# Patient Record
Sex: FEMALE | Race: WHITE | Employment: UNEMPLOYED | URBAN - METROPOLITAN AREA
[De-identification: names, ages, dates, MRNs, and addresses within clinical notes are randomized per-mention and may not be internally consistent; named-entity substitution may affect disease eponyms.]

---

## 2017-05-19 ENCOUNTER — TELEPHONE (OUTPATIENT)
Dept: VASCULAR SURGERY | Age: 82
End: 2017-05-19

## 2017-06-08 ENCOUNTER — HOSPITAL ENCOUNTER (OUTPATIENT)
Dept: VASCULAR LAB | Age: 82
Discharge: HOME | End: 2017-06-08
Attending: FAMILY MEDICINE | Admitting: FAMILY MEDICINE

## 2017-06-08 DIAGNOSIS — L03.116 CELLULITIS OF LEFT LOWER EXTREMITY: ICD-10-CM

## 2017-06-22 ENCOUNTER — OFFICE VISIT (OUTPATIENT)
Dept: VASCULAR SURGERY | Age: 82
End: 2017-06-22

## 2017-06-22 VITALS
SYSTOLIC BLOOD PRESSURE: 110 MMHG | HEIGHT: 65 IN | WEIGHT: 142 LBS | BODY MASS INDEX: 23.66 KG/M2 | DIASTOLIC BLOOD PRESSURE: 70 MMHG

## 2017-06-22 DIAGNOSIS — S81.802D LEG WOUND, LEFT, SUBSEQUENT ENCOUNTER: Primary | ICD-10-CM

## 2017-06-22 PROCEDURE — 1036F TOBACCO NON-USER: CPT | Performed by: SURGERY

## 2017-06-22 PROCEDURE — 4040F PNEUMOC VAC/ADMIN/RCVD: CPT | Performed by: SURGERY

## 2017-06-22 PROCEDURE — 29580 STRAPPING UNNA BOOT: CPT | Performed by: SURGERY

## 2017-06-22 PROCEDURE — G8420 CALC BMI NORM PARAMETERS: HCPCS | Performed by: SURGERY

## 2017-06-22 PROCEDURE — 1123F ACP DISCUSS/DSCN MKR DOCD: CPT | Performed by: SURGERY

## 2017-06-22 PROCEDURE — 99212 OFFICE O/P EST SF 10 MIN: CPT | Performed by: SURGERY

## 2017-06-22 PROCEDURE — G8427 DOCREV CUR MEDS BY ELIG CLIN: HCPCS | Performed by: SURGERY

## 2017-06-22 PROCEDURE — 1090F PRES/ABSN URINE INCON ASSESS: CPT | Performed by: SURGERY

## 2017-06-22 RX ORDER — MEMANTINE HYDROCHLORIDE 10 MG/1
10 TABLET ORAL 2 TIMES DAILY
COMMUNITY
End: 2018-05-10 | Stop reason: SDUPTHER

## 2017-06-22 RX ORDER — DILTIAZEM HYDROCHLORIDE 120 MG/1
120 CAPSULE, COATED, EXTENDED RELEASE ORAL DAILY
COMMUNITY
End: 2017-06-22 | Stop reason: SDUPTHER

## 2017-06-29 ENCOUNTER — OFFICE VISIT (OUTPATIENT)
Dept: VASCULAR SURGERY | Age: 82
End: 2017-06-29

## 2017-06-29 VITALS
DIASTOLIC BLOOD PRESSURE: 70 MMHG | BODY MASS INDEX: 23.66 KG/M2 | HEIGHT: 65 IN | WEIGHT: 142 LBS | SYSTOLIC BLOOD PRESSURE: 136 MMHG

## 2017-06-29 PROCEDURE — G8427 DOCREV CUR MEDS BY ELIG CLIN: HCPCS | Performed by: SURGERY

## 2017-06-29 PROCEDURE — G8420 CALC BMI NORM PARAMETERS: HCPCS | Performed by: SURGERY

## 2017-06-29 PROCEDURE — 99212 OFFICE O/P EST SF 10 MIN: CPT | Performed by: SURGERY

## 2017-06-29 PROCEDURE — 1036F TOBACCO NON-USER: CPT | Performed by: SURGERY

## 2017-06-29 PROCEDURE — 29580 STRAPPING UNNA BOOT: CPT | Performed by: SURGERY

## 2017-06-29 PROCEDURE — 1123F ACP DISCUSS/DSCN MKR DOCD: CPT | Performed by: SURGERY

## 2017-06-29 PROCEDURE — 4040F PNEUMOC VAC/ADMIN/RCVD: CPT | Performed by: SURGERY

## 2017-06-29 PROCEDURE — 1090F PRES/ABSN URINE INCON ASSESS: CPT | Performed by: SURGERY

## 2017-06-29 RX ORDER — OXYCODONE HYDROCHLORIDE AND ACETAMINOPHEN 5; 325 MG/1; MG/1
2 TABLET ORAL EVERY 6 HOURS PRN
Qty: 20 TABLET | Refills: 0 | Status: SHIPPED | OUTPATIENT
Start: 2017-06-29 | End: 2017-07-13 | Stop reason: SDUPTHER

## 2017-07-06 ENCOUNTER — OFFICE VISIT (OUTPATIENT)
Dept: VASCULAR SURGERY | Age: 82
End: 2017-07-06

## 2017-07-06 VITALS
DIASTOLIC BLOOD PRESSURE: 80 MMHG | HEIGHT: 65 IN | SYSTOLIC BLOOD PRESSURE: 124 MMHG | BODY MASS INDEX: 23.66 KG/M2 | WEIGHT: 142 LBS

## 2017-07-06 PROCEDURE — G8420 CALC BMI NORM PARAMETERS: HCPCS | Performed by: SURGERY

## 2017-07-06 PROCEDURE — 99212 OFFICE O/P EST SF 10 MIN: CPT | Performed by: SURGERY

## 2017-07-06 PROCEDURE — 29580 STRAPPING UNNA BOOT: CPT | Performed by: SURGERY

## 2017-07-06 PROCEDURE — 4040F PNEUMOC VAC/ADMIN/RCVD: CPT | Performed by: SURGERY

## 2017-07-06 PROCEDURE — 1123F ACP DISCUSS/DSCN MKR DOCD: CPT | Performed by: SURGERY

## 2017-07-06 PROCEDURE — G8427 DOCREV CUR MEDS BY ELIG CLIN: HCPCS | Performed by: SURGERY

## 2017-07-06 PROCEDURE — 1036F TOBACCO NON-USER: CPT | Performed by: SURGERY

## 2017-07-06 PROCEDURE — 1090F PRES/ABSN URINE INCON ASSESS: CPT | Performed by: SURGERY

## 2017-07-13 ENCOUNTER — OFFICE VISIT (OUTPATIENT)
Dept: VASCULAR SURGERY | Age: 82
End: 2017-07-13

## 2017-07-13 VITALS
WEIGHT: 142 LBS | DIASTOLIC BLOOD PRESSURE: 80 MMHG | BODY MASS INDEX: 23.66 KG/M2 | SYSTOLIC BLOOD PRESSURE: 138 MMHG | HEIGHT: 65 IN

## 2017-07-13 DIAGNOSIS — S81.802D LEG WOUND, LEFT, SUBSEQUENT ENCOUNTER: ICD-10-CM

## 2017-07-13 PROCEDURE — 99212 OFFICE O/P EST SF 10 MIN: CPT | Performed by: SURGERY

## 2017-07-13 PROCEDURE — 1036F TOBACCO NON-USER: CPT | Performed by: SURGERY

## 2017-07-13 PROCEDURE — 1090F PRES/ABSN URINE INCON ASSESS: CPT | Performed by: SURGERY

## 2017-07-13 PROCEDURE — G8427 DOCREV CUR MEDS BY ELIG CLIN: HCPCS | Performed by: SURGERY

## 2017-07-13 PROCEDURE — 1123F ACP DISCUSS/DSCN MKR DOCD: CPT | Performed by: SURGERY

## 2017-07-13 PROCEDURE — 29580 STRAPPING UNNA BOOT: CPT | Performed by: SURGERY

## 2017-07-13 PROCEDURE — G8420 CALC BMI NORM PARAMETERS: HCPCS | Performed by: SURGERY

## 2017-07-13 PROCEDURE — 4040F PNEUMOC VAC/ADMIN/RCVD: CPT | Performed by: SURGERY

## 2017-07-13 RX ORDER — OXYCODONE HYDROCHLORIDE AND ACETAMINOPHEN 5; 325 MG/1; MG/1
2 TABLET ORAL EVERY 6 HOURS PRN
Qty: 20 TABLET | Refills: 0 | Status: SHIPPED | OUTPATIENT
Start: 2017-07-13 | End: 2017-08-24 | Stop reason: SDUPTHER

## 2017-07-20 ENCOUNTER — OFFICE VISIT (OUTPATIENT)
Dept: VASCULAR SURGERY | Age: 82
End: 2017-07-20

## 2017-07-20 VITALS
SYSTOLIC BLOOD PRESSURE: 118 MMHG | BODY MASS INDEX: 23.66 KG/M2 | DIASTOLIC BLOOD PRESSURE: 80 MMHG | HEIGHT: 65 IN | WEIGHT: 142 LBS

## 2017-07-20 DIAGNOSIS — S81.802D LEG WOUND, LEFT, SUBSEQUENT ENCOUNTER: ICD-10-CM

## 2017-07-20 PROCEDURE — 1036F TOBACCO NON-USER: CPT | Performed by: SURGERY

## 2017-07-20 PROCEDURE — G8420 CALC BMI NORM PARAMETERS: HCPCS | Performed by: SURGERY

## 2017-07-20 PROCEDURE — 4040F PNEUMOC VAC/ADMIN/RCVD: CPT | Performed by: SURGERY

## 2017-07-20 PROCEDURE — 1090F PRES/ABSN URINE INCON ASSESS: CPT | Performed by: SURGERY

## 2017-07-20 PROCEDURE — 1123F ACP DISCUSS/DSCN MKR DOCD: CPT | Performed by: SURGERY

## 2017-07-20 PROCEDURE — 99212 OFFICE O/P EST SF 10 MIN: CPT | Performed by: SURGERY

## 2017-07-20 PROCEDURE — G8427 DOCREV CUR MEDS BY ELIG CLIN: HCPCS | Performed by: SURGERY

## 2017-07-20 PROCEDURE — 29580 STRAPPING UNNA BOOT: CPT | Performed by: SURGERY

## 2017-07-27 ENCOUNTER — OFFICE VISIT (OUTPATIENT)
Dept: VASCULAR SURGERY | Age: 82
End: 2017-07-27

## 2017-07-27 VITALS — BODY MASS INDEX: 23.66 KG/M2 | HEIGHT: 65 IN | WEIGHT: 142 LBS

## 2017-07-27 PROCEDURE — 4040F PNEUMOC VAC/ADMIN/RCVD: CPT | Performed by: SURGERY

## 2017-07-27 PROCEDURE — 1123F ACP DISCUSS/DSCN MKR DOCD: CPT | Performed by: SURGERY

## 2017-07-27 PROCEDURE — 1036F TOBACCO NON-USER: CPT | Performed by: SURGERY

## 2017-07-27 PROCEDURE — G8420 CALC BMI NORM PARAMETERS: HCPCS | Performed by: SURGERY

## 2017-07-27 PROCEDURE — 99212 OFFICE O/P EST SF 10 MIN: CPT | Performed by: SURGERY

## 2017-07-27 PROCEDURE — 1090F PRES/ABSN URINE INCON ASSESS: CPT | Performed by: SURGERY

## 2017-07-27 PROCEDURE — G8427 DOCREV CUR MEDS BY ELIG CLIN: HCPCS | Performed by: SURGERY

## 2017-07-27 PROCEDURE — 29580 STRAPPING UNNA BOOT: CPT | Performed by: SURGERY

## 2017-08-01 ENCOUNTER — TELEPHONE (OUTPATIENT)
Dept: CARDIOTHORACIC SURGERY | Age: 82
End: 2017-08-01

## 2017-08-10 ENCOUNTER — OFFICE VISIT (OUTPATIENT)
Dept: VASCULAR SURGERY | Age: 82
End: 2017-08-10

## 2017-08-10 VITALS
DIASTOLIC BLOOD PRESSURE: 60 MMHG | WEIGHT: 142 LBS | SYSTOLIC BLOOD PRESSURE: 122 MMHG | BODY MASS INDEX: 23.66 KG/M2 | HEIGHT: 65 IN

## 2017-08-10 PROCEDURE — 29580 STRAPPING UNNA BOOT: CPT | Performed by: SURGERY

## 2017-08-10 PROCEDURE — G8427 DOCREV CUR MEDS BY ELIG CLIN: HCPCS | Performed by: SURGERY

## 2017-08-10 PROCEDURE — G8420 CALC BMI NORM PARAMETERS: HCPCS | Performed by: SURGERY

## 2017-08-10 PROCEDURE — 1036F TOBACCO NON-USER: CPT | Performed by: SURGERY

## 2017-08-10 PROCEDURE — 1090F PRES/ABSN URINE INCON ASSESS: CPT | Performed by: SURGERY

## 2017-08-10 PROCEDURE — 4040F PNEUMOC VAC/ADMIN/RCVD: CPT | Performed by: SURGERY

## 2017-08-10 PROCEDURE — 99212 OFFICE O/P EST SF 10 MIN: CPT | Performed by: SURGERY

## 2017-08-10 PROCEDURE — 1123F ACP DISCUSS/DSCN MKR DOCD: CPT | Performed by: SURGERY

## 2017-08-24 ENCOUNTER — OFFICE VISIT (OUTPATIENT)
Dept: VASCULAR SURGERY | Age: 82
End: 2017-08-24

## 2017-08-24 VITALS
WEIGHT: 142 LBS | BODY MASS INDEX: 23.66 KG/M2 | SYSTOLIC BLOOD PRESSURE: 118 MMHG | DIASTOLIC BLOOD PRESSURE: 80 MMHG | HEIGHT: 65 IN

## 2017-08-24 DIAGNOSIS — S81.802D LEG WOUND, LEFT, SUBSEQUENT ENCOUNTER: ICD-10-CM

## 2017-08-24 PROCEDURE — 29580 STRAPPING UNNA BOOT: CPT | Performed by: SURGERY

## 2017-08-24 PROCEDURE — 4040F PNEUMOC VAC/ADMIN/RCVD: CPT | Performed by: SURGERY

## 2017-08-24 PROCEDURE — 1036F TOBACCO NON-USER: CPT | Performed by: SURGERY

## 2017-08-24 PROCEDURE — G8420 CALC BMI NORM PARAMETERS: HCPCS | Performed by: SURGERY

## 2017-08-24 PROCEDURE — 1123F ACP DISCUSS/DSCN MKR DOCD: CPT | Performed by: SURGERY

## 2017-08-24 PROCEDURE — G8427 DOCREV CUR MEDS BY ELIG CLIN: HCPCS | Performed by: SURGERY

## 2017-08-24 PROCEDURE — 99212 OFFICE O/P EST SF 10 MIN: CPT | Performed by: SURGERY

## 2017-08-24 PROCEDURE — 1090F PRES/ABSN URINE INCON ASSESS: CPT | Performed by: SURGERY

## 2017-08-24 RX ORDER — OXYCODONE HYDROCHLORIDE AND ACETAMINOPHEN 5; 325 MG/1; MG/1
2 TABLET ORAL EVERY 6 HOURS PRN
Qty: 20 TABLET | Refills: 0 | Status: SHIPPED | OUTPATIENT
Start: 2017-08-24 | End: 2017-08-28 | Stop reason: SDUPTHER

## 2017-08-28 DIAGNOSIS — S81.802D LEG WOUND, LEFT, SUBSEQUENT ENCOUNTER: ICD-10-CM

## 2017-08-28 RX ORDER — OXYCODONE HYDROCHLORIDE AND ACETAMINOPHEN 5; 325 MG/1; MG/1
2 TABLET ORAL EVERY 6 HOURS PRN
Qty: 20 TABLET | Refills: 0 | Status: SHIPPED | OUTPATIENT
Start: 2017-08-28 | End: 2017-10-10 | Stop reason: SDUPTHER

## 2017-08-31 ENCOUNTER — OFFICE VISIT (OUTPATIENT)
Dept: VASCULAR SURGERY | Age: 82
End: 2017-08-31

## 2017-08-31 VITALS
SYSTOLIC BLOOD PRESSURE: 136 MMHG | BODY MASS INDEX: 23.66 KG/M2 | HEIGHT: 65 IN | DIASTOLIC BLOOD PRESSURE: 80 MMHG | WEIGHT: 142 LBS

## 2017-08-31 PROCEDURE — 29580 STRAPPING UNNA BOOT: CPT | Performed by: SURGERY

## 2017-08-31 PROCEDURE — G8427 DOCREV CUR MEDS BY ELIG CLIN: HCPCS | Performed by: SURGERY

## 2017-08-31 PROCEDURE — 1123F ACP DISCUSS/DSCN MKR DOCD: CPT | Performed by: SURGERY

## 2017-08-31 PROCEDURE — 4040F PNEUMOC VAC/ADMIN/RCVD: CPT | Performed by: SURGERY

## 2017-08-31 PROCEDURE — 1090F PRES/ABSN URINE INCON ASSESS: CPT | Performed by: SURGERY

## 2017-08-31 PROCEDURE — 99212 OFFICE O/P EST SF 10 MIN: CPT | Performed by: SURGERY

## 2017-08-31 PROCEDURE — G8420 CALC BMI NORM PARAMETERS: HCPCS | Performed by: SURGERY

## 2017-08-31 PROCEDURE — 1036F TOBACCO NON-USER: CPT | Performed by: SURGERY

## 2017-09-07 ENCOUNTER — OFFICE VISIT (OUTPATIENT)
Dept: VASCULAR SURGERY | Age: 82
End: 2017-09-07

## 2017-09-07 VITALS
DIASTOLIC BLOOD PRESSURE: 80 MMHG | BODY MASS INDEX: 23.66 KG/M2 | HEIGHT: 65 IN | SYSTOLIC BLOOD PRESSURE: 118 MMHG | WEIGHT: 142 LBS

## 2017-09-07 PROCEDURE — 1090F PRES/ABSN URINE INCON ASSESS: CPT | Performed by: SURGERY

## 2017-09-07 PROCEDURE — 29580 STRAPPING UNNA BOOT: CPT | Performed by: SURGERY

## 2017-09-07 PROCEDURE — G8420 CALC BMI NORM PARAMETERS: HCPCS | Performed by: SURGERY

## 2017-09-07 PROCEDURE — 4040F PNEUMOC VAC/ADMIN/RCVD: CPT | Performed by: SURGERY

## 2017-09-07 PROCEDURE — 1036F TOBACCO NON-USER: CPT | Performed by: SURGERY

## 2017-09-07 PROCEDURE — 1123F ACP DISCUSS/DSCN MKR DOCD: CPT | Performed by: SURGERY

## 2017-09-07 PROCEDURE — G8427 DOCREV CUR MEDS BY ELIG CLIN: HCPCS | Performed by: SURGERY

## 2017-09-07 PROCEDURE — 99212 OFFICE O/P EST SF 10 MIN: CPT | Performed by: SURGERY

## 2017-09-14 ENCOUNTER — OFFICE VISIT (OUTPATIENT)
Dept: VASCULAR SURGERY | Age: 82
End: 2017-09-14

## 2017-09-14 VITALS
DIASTOLIC BLOOD PRESSURE: 72 MMHG | WEIGHT: 142 LBS | HEIGHT: 65 IN | BODY MASS INDEX: 23.66 KG/M2 | SYSTOLIC BLOOD PRESSURE: 118 MMHG

## 2017-09-14 PROCEDURE — 1123F ACP DISCUSS/DSCN MKR DOCD: CPT | Performed by: SURGERY

## 2017-09-14 PROCEDURE — G8427 DOCREV CUR MEDS BY ELIG CLIN: HCPCS | Performed by: SURGERY

## 2017-09-14 PROCEDURE — 1090F PRES/ABSN URINE INCON ASSESS: CPT | Performed by: SURGERY

## 2017-09-14 PROCEDURE — 4040F PNEUMOC VAC/ADMIN/RCVD: CPT | Performed by: SURGERY

## 2017-09-14 PROCEDURE — 99212 OFFICE O/P EST SF 10 MIN: CPT | Performed by: SURGERY

## 2017-09-14 PROCEDURE — 1036F TOBACCO NON-USER: CPT | Performed by: SURGERY

## 2017-09-14 PROCEDURE — G8420 CALC BMI NORM PARAMETERS: HCPCS | Performed by: SURGERY

## 2017-09-14 PROCEDURE — 29580 STRAPPING UNNA BOOT: CPT | Performed by: SURGERY

## 2017-09-21 ENCOUNTER — OFFICE VISIT (OUTPATIENT)
Dept: VASCULAR SURGERY | Age: 82
End: 2017-09-21

## 2017-09-21 VITALS
WEIGHT: 142 LBS | DIASTOLIC BLOOD PRESSURE: 80 MMHG | SYSTOLIC BLOOD PRESSURE: 130 MMHG | HEIGHT: 65 IN | BODY MASS INDEX: 23.66 KG/M2

## 2017-09-21 PROCEDURE — 4040F PNEUMOC VAC/ADMIN/RCVD: CPT | Performed by: SURGERY

## 2017-09-21 PROCEDURE — 1036F TOBACCO NON-USER: CPT | Performed by: SURGERY

## 2017-09-21 PROCEDURE — 29580 STRAPPING UNNA BOOT: CPT | Performed by: SURGERY

## 2017-09-21 PROCEDURE — 1123F ACP DISCUSS/DSCN MKR DOCD: CPT | Performed by: SURGERY

## 2017-09-21 PROCEDURE — 1090F PRES/ABSN URINE INCON ASSESS: CPT | Performed by: SURGERY

## 2017-09-21 PROCEDURE — 99212 OFFICE O/P EST SF 10 MIN: CPT | Performed by: SURGERY

## 2017-09-21 PROCEDURE — G8420 CALC BMI NORM PARAMETERS: HCPCS | Performed by: SURGERY

## 2017-09-21 PROCEDURE — G8427 DOCREV CUR MEDS BY ELIG CLIN: HCPCS | Performed by: SURGERY

## 2017-09-28 ENCOUNTER — OFFICE VISIT (OUTPATIENT)
Dept: VASCULAR SURGERY | Age: 82
End: 2017-09-28

## 2017-09-28 VITALS
BODY MASS INDEX: 23.66 KG/M2 | SYSTOLIC BLOOD PRESSURE: 124 MMHG | WEIGHT: 142 LBS | DIASTOLIC BLOOD PRESSURE: 80 MMHG | HEIGHT: 65 IN

## 2017-09-28 PROCEDURE — 4040F PNEUMOC VAC/ADMIN/RCVD: CPT | Performed by: SURGERY

## 2017-09-28 PROCEDURE — G8427 DOCREV CUR MEDS BY ELIG CLIN: HCPCS | Performed by: SURGERY

## 2017-09-28 PROCEDURE — 99212 OFFICE O/P EST SF 10 MIN: CPT | Performed by: SURGERY

## 2017-09-28 PROCEDURE — 1123F ACP DISCUSS/DSCN MKR DOCD: CPT | Performed by: SURGERY

## 2017-09-28 PROCEDURE — 29580 STRAPPING UNNA BOOT: CPT | Performed by: SURGERY

## 2017-09-28 PROCEDURE — 1036F TOBACCO NON-USER: CPT | Performed by: SURGERY

## 2017-09-28 PROCEDURE — G8420 CALC BMI NORM PARAMETERS: HCPCS | Performed by: SURGERY

## 2017-09-28 PROCEDURE — 1090F PRES/ABSN URINE INCON ASSESS: CPT | Performed by: SURGERY

## 2017-10-05 ENCOUNTER — OFFICE VISIT (OUTPATIENT)
Dept: VASCULAR SURGERY | Age: 82
End: 2017-10-05

## 2017-10-05 VITALS
DIASTOLIC BLOOD PRESSURE: 60 MMHG | HEIGHT: 65 IN | SYSTOLIC BLOOD PRESSURE: 110 MMHG | WEIGHT: 142 LBS | BODY MASS INDEX: 23.66 KG/M2

## 2017-10-05 PROCEDURE — 29580 STRAPPING UNNA BOOT: CPT | Performed by: SURGERY

## 2017-10-05 PROCEDURE — 1090F PRES/ABSN URINE INCON ASSESS: CPT | Performed by: SURGERY

## 2017-10-05 PROCEDURE — 99212 OFFICE O/P EST SF 10 MIN: CPT | Performed by: SURGERY

## 2017-10-05 PROCEDURE — G8420 CALC BMI NORM PARAMETERS: HCPCS | Performed by: SURGERY

## 2017-10-05 PROCEDURE — 1123F ACP DISCUSS/DSCN MKR DOCD: CPT | Performed by: SURGERY

## 2017-10-05 PROCEDURE — G8484 FLU IMMUNIZE NO ADMIN: HCPCS | Performed by: SURGERY

## 2017-10-05 PROCEDURE — 1036F TOBACCO NON-USER: CPT | Performed by: SURGERY

## 2017-10-05 PROCEDURE — 4040F PNEUMOC VAC/ADMIN/RCVD: CPT | Performed by: SURGERY

## 2017-10-05 PROCEDURE — G8427 DOCREV CUR MEDS BY ELIG CLIN: HCPCS | Performed by: SURGERY

## 2017-10-05 NOTE — PROGRESS NOTES
Seen today for L ankle wounds. Continued in [facility] last week and tolerated with essentially no pain. Elevating feet intermittently but not as well this past week per son. Sized by [employer] but issue of payment\ for old and new stockings. EXAM:  L knee contracture with less difficulty straightening. Edema controlled well - no erythema. 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 Elevation. F/U one week.

## 2017-10-10 RX ORDER — OXYCODONE HYDROCHLORIDE AND ACETAMINOPHEN 5; 325 MG/1; MG/1
2 TABLET ORAL EVERY 6 HOURS PRN
Qty: 20 TABLET | Refills: 0 | Status: SHIPPED | OUTPATIENT
Start: 2017-10-10 | End: 2017-10-19 | Stop reason: ALTCHOICE

## 2017-10-12 ENCOUNTER — OFFICE VISIT (OUTPATIENT)
Dept: VASCULAR SURGERY | Age: 82
End: 2017-10-12

## 2017-10-12 VITALS
DIASTOLIC BLOOD PRESSURE: 80 MMHG | BODY MASS INDEX: 23.66 KG/M2 | WEIGHT: 142 LBS | SYSTOLIC BLOOD PRESSURE: 136 MMHG | HEIGHT: 65 IN

## 2017-10-12 DIAGNOSIS — S81.802D LEG WOUND, LEFT, SUBSEQUENT ENCOUNTER: ICD-10-CM

## 2017-10-12 PROCEDURE — 1036F TOBACCO NON-USER: CPT | Performed by: SURGERY

## 2017-10-12 PROCEDURE — 29580 STRAPPING UNNA BOOT: CPT | Performed by: SURGERY

## 2017-10-12 PROCEDURE — 1123F ACP DISCUSS/DSCN MKR DOCD: CPT | Performed by: SURGERY

## 2017-10-12 PROCEDURE — G8420 CALC BMI NORM PARAMETERS: HCPCS | Performed by: SURGERY

## 2017-10-12 PROCEDURE — G8484 FLU IMMUNIZE NO ADMIN: HCPCS | Performed by: SURGERY

## 2017-10-12 PROCEDURE — G8427 DOCREV CUR MEDS BY ELIG CLIN: HCPCS | Performed by: SURGERY

## 2017-10-12 PROCEDURE — 4040F PNEUMOC VAC/ADMIN/RCVD: CPT | Performed by: SURGERY

## 2017-10-12 PROCEDURE — 1090F PRES/ABSN URINE INCON ASSESS: CPT | Performed by: SURGERY

## 2017-10-12 PROCEDURE — 99212 OFFICE O/P EST SF 10 MIN: CPT | Performed by: SURGERY

## 2017-10-19 ENCOUNTER — OFFICE VISIT (OUTPATIENT)
Dept: INTERNAL MEDICINE CLINIC | Age: 82
End: 2017-10-19

## 2017-10-19 ENCOUNTER — TELEPHONE (OUTPATIENT)
Dept: INTERNAL MEDICINE CLINIC | Age: 82
End: 2017-10-19

## 2017-10-19 VITALS
RESPIRATION RATE: 16 BRPM | BODY MASS INDEX: 21.99 KG/M2 | WEIGHT: 132 LBS | DIASTOLIC BLOOD PRESSURE: 62 MMHG | HEIGHT: 65 IN | SYSTOLIC BLOOD PRESSURE: 158 MMHG | HEART RATE: 72 BPM

## 2017-10-19 DIAGNOSIS — S81.802S WOUND OF LEFT LOWER EXTREMITY, SEQUELA: Chronic | ICD-10-CM

## 2017-10-19 DIAGNOSIS — I10 ESSENTIAL HYPERTENSION: Primary | ICD-10-CM

## 2017-10-19 DIAGNOSIS — E03.9 ACQUIRED HYPOTHYROIDISM: ICD-10-CM

## 2017-10-19 DIAGNOSIS — G62.9 NEUROPATHY: ICD-10-CM

## 2017-10-19 DIAGNOSIS — F41.9 ANXIETY: ICD-10-CM

## 2017-10-19 DIAGNOSIS — L30.4 INTERTRIGO: ICD-10-CM

## 2017-10-19 DIAGNOSIS — F34.1 DYSTHYMIA: ICD-10-CM

## 2017-10-19 DIAGNOSIS — I35.0 NONRHEUMATIC AORTIC VALVE STENOSIS: ICD-10-CM

## 2017-10-19 LAB — TSH REFLEX: 1.97 UIU/ML (ref 0.27–4.2)

## 2017-10-19 PROCEDURE — 1123F ACP DISCUSS/DSCN MKR DOCD: CPT | Performed by: INTERNAL MEDICINE

## 2017-10-19 PROCEDURE — 4040F PNEUMOC VAC/ADMIN/RCVD: CPT | Performed by: INTERNAL MEDICINE

## 2017-10-19 PROCEDURE — 1090F PRES/ABSN URINE INCON ASSESS: CPT | Performed by: INTERNAL MEDICINE

## 2017-10-19 PROCEDURE — 99204 OFFICE O/P NEW MOD 45 MIN: CPT | Performed by: INTERNAL MEDICINE

## 2017-10-19 PROCEDURE — G8484 FLU IMMUNIZE NO ADMIN: HCPCS | Performed by: INTERNAL MEDICINE

## 2017-10-19 PROCEDURE — 36415 COLL VENOUS BLD VENIPUNCTURE: CPT | Performed by: INTERNAL MEDICINE

## 2017-10-19 PROCEDURE — G8420 CALC BMI NORM PARAMETERS: HCPCS | Performed by: INTERNAL MEDICINE

## 2017-10-19 PROCEDURE — G8427 DOCREV CUR MEDS BY ELIG CLIN: HCPCS | Performed by: INTERNAL MEDICINE

## 2017-10-19 PROCEDURE — 1036F TOBACCO NON-USER: CPT | Performed by: INTERNAL MEDICINE

## 2017-10-19 RX ORDER — DILTIAZEM HYDROCHLORIDE 120 MG/1
120 CAPSULE, COATED, EXTENDED RELEASE ORAL DAILY
COMMUNITY
End: 2017-12-07 | Stop reason: SDUPTHER

## 2017-10-19 RX ORDER — GABAPENTIN 100 MG/1
100 CAPSULE ORAL 3 TIMES DAILY
COMMUNITY
End: 2017-12-07 | Stop reason: SDUPTHER

## 2017-10-19 RX ORDER — CITALOPRAM 20 MG/1
20 TABLET ORAL DAILY
COMMUNITY
End: 2017-12-07 | Stop reason: SDUPTHER

## 2017-10-19 RX ORDER — PHENOL 1.4 %
1 AEROSOL, SPRAY (ML) MUCOUS MEMBRANE DAILY
COMMUNITY
End: 2019-06-25 | Stop reason: ALTCHOICE

## 2017-10-19 ASSESSMENT — ENCOUNTER SYMPTOMS
RESPIRATORY NEGATIVE: 1
ORTHOPNEA: 0
BLURRED VISION: 0
ALLERGIC/IMMUNOLOGIC NEGATIVE: 1
SHORTNESS OF BREATH: 0
GASTROINTESTINAL NEGATIVE: 1
EYES NEGATIVE: 1

## 2017-10-19 ASSESSMENT — PATIENT HEALTH QUESTIONNAIRE - PHQ9
SUM OF ALL RESPONSES TO PHQ9 QUESTIONS 1 & 2: 0
2. FEELING DOWN, DEPRESSED OR HOPELESS: 0
SUM OF ALL RESPONSES TO PHQ QUESTIONS 1-9: 0
1. LITTLE INTEREST OR PLEASURE IN DOING THINGS: 0

## 2017-10-19 NOTE — PROGRESS NOTES
Subjective:    cc:  Htn, thyroid dz, neuropathy, anxiety, depression,arthritis, left leg ulcer and as recheck  Patient ID: Mateo Ley is a 80 y.o. female. Hypertension   This is a chronic problem. The current episode started more than 1 year ago. The problem is unchanged. The problem is controlled. Pertinent negatives include no blurred vision, chest pain, headaches, orthopnea, palpitations, peripheral edema, PND, shortness of breath or sweats. There are no associated agents to hypertension. Risk factors for coronary artery disease include dyslipidemia, sedentary lifestyle, smoking/tobacco exposure and post-menopausal state. Past treatments include calcium channel blockers and ACE inhibitors. The current treatment provides significant improvement. There are no compliance problems. peripheral neuropathy:  Chronic problem x yrs. Compliant with meds. On gabapentin with good relief of burning. Walks with walker. No falls. Hypothyroid:  Chronic problem x yrs. Compliant with meds. No chest pain, shortness of breath or syncope. Left leg ulcer:  Seeing vasc surgery. Slow healilng. Anxiety/depression:  Chronic problem x yrs. Compliant with meds. Anxiety under good control with current medications. Depression under good control with current medications. Wants to continue. C/o red rash under left breast    Review of Systems   Constitutional: Negative. HENT: Negative. Eyes: Negative. Negative for blurred vision. Respiratory: Negative. Negative for shortness of breath. Cardiovascular: Negative. Negative for chest pain, palpitations, orthopnea and PND. Gastrointestinal: Negative. Endocrine: Negative. Genitourinary: Negative. Musculoskeletal: Negative. Negative for myalgias. Skin: Positive for rash. Allergic/Immunologic: Negative. Neurological: Negative. Negative for focal weakness and headaches. Psychiatric/Behavioral: Negative.       Allergies   Allergen

## 2017-10-26 ENCOUNTER — HOSPITAL ENCOUNTER (OUTPATIENT)
Dept: NON INVASIVE DIAGNOSTICS | Age: 82
Discharge: HOME | End: 2017-10-26

## 2017-10-26 ENCOUNTER — OFFICE VISIT (OUTPATIENT)
Dept: VASCULAR SURGERY | Age: 82
End: 2017-10-26

## 2017-10-26 VITALS — BODY MASS INDEX: 23.66 KG/M2 | WEIGHT: 142 LBS | HEIGHT: 65 IN

## 2017-10-26 DIAGNOSIS — I35.0 NONRHEUMATIC AORTIC VALVE STENOSIS: ICD-10-CM

## 2017-10-26 DIAGNOSIS — S81.802D LEG WOUND, LEFT, SUBSEQUENT ENCOUNTER: Primary | ICD-10-CM

## 2017-10-26 LAB
LV EF: 58 %
LVEF MODALITY: NORMAL

## 2017-10-26 PROCEDURE — 4040F PNEUMOC VAC/ADMIN/RCVD: CPT | Performed by: SURGERY

## 2017-10-26 PROCEDURE — G8420 CALC BMI NORM PARAMETERS: HCPCS | Performed by: SURGERY

## 2017-10-26 PROCEDURE — 1090F PRES/ABSN URINE INCON ASSESS: CPT | Performed by: SURGERY

## 2017-10-26 PROCEDURE — G8427 DOCREV CUR MEDS BY ELIG CLIN: HCPCS | Performed by: SURGERY

## 2017-10-26 PROCEDURE — 99212 OFFICE O/P EST SF 10 MIN: CPT | Performed by: SURGERY

## 2017-10-26 PROCEDURE — 1123F ACP DISCUSS/DSCN MKR DOCD: CPT | Performed by: SURGERY

## 2017-10-26 PROCEDURE — G8484 FLU IMMUNIZE NO ADMIN: HCPCS | Performed by: SURGERY

## 2017-10-26 PROCEDURE — 1036F TOBACCO NON-USER: CPT | Performed by: SURGERY

## 2017-10-26 NOTE — PROGRESS NOTES
Seen today for L ankle wounds. Missed last week's appt - wrap in place for 14 days but did not slip. Elevating feet intermittently. Now have stockings - sized 6 weeks ago. EXAM:  L knee contracture with less difficulty straightening. Edema controlled well - no erythema. Lateral wound healed - some scabbing    Stocking is very slightly larger than expected due to continued edema reduction. 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 Will begin stocking with nylon on skin. May keep on 2 full days before removing for one night. Moisturize skin and steroid when off. F/U 2 weeks.

## 2017-10-27 ENCOUNTER — TELEPHONE (OUTPATIENT)
Dept: CARDIOLOGY CLINIC | Age: 82
End: 2017-10-27

## 2017-10-31 ENCOUNTER — TELEPHONE (OUTPATIENT)
Dept: INTERNAL MEDICINE CLINIC | Age: 82
End: 2017-10-31

## 2017-11-09 ENCOUNTER — OFFICE VISIT (OUTPATIENT)
Dept: VASCULAR SURGERY | Age: 82
End: 2017-11-09

## 2017-11-09 VITALS
WEIGHT: 142 LBS | SYSTOLIC BLOOD PRESSURE: 118 MMHG | BODY MASS INDEX: 23.66 KG/M2 | DIASTOLIC BLOOD PRESSURE: 80 MMHG | HEIGHT: 65 IN

## 2017-11-09 DIAGNOSIS — L97.929 STASIS DERMATITIS OF LEFT LOWER EXTREMITY WITH VENOUS ULCER DUE TO CHRONIC PERIPHERAL VENOUS HYPERTENSION (HCC): ICD-10-CM

## 2017-11-09 DIAGNOSIS — I87.332 STASIS DERMATITIS OF LEFT LOWER EXTREMITY WITH VENOUS ULCER DUE TO CHRONIC PERIPHERAL VENOUS HYPERTENSION (HCC): ICD-10-CM

## 2017-11-09 DIAGNOSIS — L03.116 CELLULITIS OF LEFT LOWER EXTREMITY: ICD-10-CM

## 2017-11-09 PROCEDURE — 1090F PRES/ABSN URINE INCON ASSESS: CPT | Performed by: SURGERY

## 2017-11-09 PROCEDURE — G8428 CUR MEDS NOT DOCUMENT: HCPCS | Performed by: SURGERY

## 2017-11-09 PROCEDURE — G8420 CALC BMI NORM PARAMETERS: HCPCS | Performed by: SURGERY

## 2017-11-09 PROCEDURE — 1123F ACP DISCUSS/DSCN MKR DOCD: CPT | Performed by: SURGERY

## 2017-11-09 PROCEDURE — G8484 FLU IMMUNIZE NO ADMIN: HCPCS | Performed by: SURGERY

## 2017-11-09 PROCEDURE — 4040F PNEUMOC VAC/ADMIN/RCVD: CPT | Performed by: SURGERY

## 2017-11-09 PROCEDURE — 29580 STRAPPING UNNA BOOT: CPT | Performed by: SURGERY

## 2017-11-09 PROCEDURE — 99212 OFFICE O/P EST SF 10 MIN: CPT | Performed by: SURGERY

## 2017-11-09 PROCEDURE — 1036F TOBACCO NON-USER: CPT | Performed by: SURGERY

## 2017-11-09 RX ORDER — CIPROFLOXACIN 250 MG/1
250 TABLET, FILM COATED ORAL 2 TIMES DAILY
Qty: 14 TABLET | Refills: 0 | Status: SHIPPED | OUTPATIENT
Start: 2017-11-09 | End: 2017-11-16

## 2017-11-09 NOTE — PROGRESS NOTES
Seen today for L ankle wounds in the past.  When last seen was placed in stocking but has not been wearing this as instructed. Son believes she has not worn it at all and he has not assisted her in applying it. Pt notes increased discomfort and redness. No drainage noted. Did not bring stocking. EXAM:  Edema recurrent - shinny and red with scratches    Lateral ankle wound remains healed     A/P: CVI - recurrent stasis dermatitis and cellulitis. Noncompliance - pt and son. Replace Unna boot with steroid cream   Begin Cipro for one week. F/U next week - bring stocking. Stressed to son the need for his help and support in being compliant - seems ambivalent.

## 2017-11-16 ENCOUNTER — OFFICE VISIT (OUTPATIENT)
Dept: VASCULAR SURGERY | Age: 82
End: 2017-11-16

## 2017-11-16 VITALS
BODY MASS INDEX: 23.66 KG/M2 | WEIGHT: 142 LBS | DIASTOLIC BLOOD PRESSURE: 80 MMHG | HEIGHT: 65 IN | SYSTOLIC BLOOD PRESSURE: 118 MMHG

## 2017-11-16 DIAGNOSIS — L97.929 STASIS DERMATITIS OF LEFT LOWER EXTREMITY WITH VENOUS ULCER DUE TO CHRONIC PERIPHERAL VENOUS HYPERTENSION (HCC): ICD-10-CM

## 2017-11-16 DIAGNOSIS — I87.332 STASIS DERMATITIS OF LEFT LOWER EXTREMITY WITH VENOUS ULCER DUE TO CHRONIC PERIPHERAL VENOUS HYPERTENSION (HCC): ICD-10-CM

## 2017-11-16 DIAGNOSIS — L03.116 CELLULITIS OF LEFT LOWER EXTREMITY: ICD-10-CM

## 2017-11-16 PROCEDURE — 1090F PRES/ABSN URINE INCON ASSESS: CPT | Performed by: SURGERY

## 2017-11-16 PROCEDURE — 1036F TOBACCO NON-USER: CPT | Performed by: SURGERY

## 2017-11-16 PROCEDURE — 4040F PNEUMOC VAC/ADMIN/RCVD: CPT | Performed by: SURGERY

## 2017-11-16 PROCEDURE — 1123F ACP DISCUSS/DSCN MKR DOCD: CPT | Performed by: SURGERY

## 2017-11-16 PROCEDURE — G8420 CALC BMI NORM PARAMETERS: HCPCS | Performed by: SURGERY

## 2017-11-16 PROCEDURE — 99212 OFFICE O/P EST SF 10 MIN: CPT | Performed by: SURGERY

## 2017-11-16 PROCEDURE — G8427 DOCREV CUR MEDS BY ELIG CLIN: HCPCS | Performed by: SURGERY

## 2017-11-16 PROCEDURE — G8484 FLU IMMUNIZE NO ADMIN: HCPCS | Performed by: SURGERY

## 2017-11-20 ENCOUNTER — OFFICE VISIT (OUTPATIENT)
Dept: CARDIOLOGY CLINIC | Age: 82
End: 2017-11-20

## 2017-11-20 VITALS
DIASTOLIC BLOOD PRESSURE: 84 MMHG | HEART RATE: 52 BPM | WEIGHT: 133 LBS | SYSTOLIC BLOOD PRESSURE: 122 MMHG | BODY MASS INDEX: 22.16 KG/M2 | HEIGHT: 65 IN

## 2017-11-20 DIAGNOSIS — E07.9 THYROID DISEASE: ICD-10-CM

## 2017-11-20 DIAGNOSIS — E78.2 MIXED HYPERLIPIDEMIA: ICD-10-CM

## 2017-11-20 DIAGNOSIS — R06.09 DOE (DYSPNEA ON EXERTION): ICD-10-CM

## 2017-11-20 DIAGNOSIS — R00.1 BRADYCARDIA: ICD-10-CM

## 2017-11-20 DIAGNOSIS — L97.329 CHRONIC ULCER OF LEFT ANKLE, UNSPECIFIED ULCER STAGE (HCC): ICD-10-CM

## 2017-11-20 DIAGNOSIS — Z87.891 FORMER SMOKER: ICD-10-CM

## 2017-11-20 DIAGNOSIS — I10 ESSENTIAL HYPERTENSION: ICD-10-CM

## 2017-11-20 DIAGNOSIS — I35.0 MODERATE TO SEVERE AORTIC STENOSIS: Primary | ICD-10-CM

## 2017-11-20 PROCEDURE — G8484 FLU IMMUNIZE NO ADMIN: HCPCS | Performed by: INTERNAL MEDICINE

## 2017-11-20 PROCEDURE — 1123F ACP DISCUSS/DSCN MKR DOCD: CPT | Performed by: INTERNAL MEDICINE

## 2017-11-20 PROCEDURE — 93000 ELECTROCARDIOGRAM COMPLETE: CPT | Performed by: INTERNAL MEDICINE

## 2017-11-20 PROCEDURE — G8420 CALC BMI NORM PARAMETERS: HCPCS | Performed by: INTERNAL MEDICINE

## 2017-11-20 PROCEDURE — 1090F PRES/ABSN URINE INCON ASSESS: CPT | Performed by: INTERNAL MEDICINE

## 2017-11-20 PROCEDURE — G8427 DOCREV CUR MEDS BY ELIG CLIN: HCPCS | Performed by: INTERNAL MEDICINE

## 2017-11-20 PROCEDURE — 4040F PNEUMOC VAC/ADMIN/RCVD: CPT | Performed by: INTERNAL MEDICINE

## 2017-11-20 PROCEDURE — 1036F TOBACCO NON-USER: CPT | Performed by: INTERNAL MEDICINE

## 2017-11-20 PROCEDURE — 99205 OFFICE O/P NEW HI 60 MIN: CPT | Performed by: INTERNAL MEDICINE

## 2017-11-20 NOTE — PATIENT INSTRUCTIONS
colds and flu. Get a pneumococcal vaccine shot. If you have had one before, ask your doctor if you need another dose. Get a flu vaccine every year. · Take care of your teeth and gums. Get regular dental checkups. Good dental health is important because bacteria can spread from infected teeth and gums to the heart valves. When should you call for help? Call 911 anytime you think you may need emergency care. For example, call if:  · You passed out (lost consciousness). · You have symptoms of a heart attack. These may include:  ¨ Chest pain or pressure, or a strange feeling in the chest.  ¨ Sweating. ¨ Shortness of breath. ¨ Nausea or vomiting. ¨ Pain, pressure, or a strange feeling in the back, neck, jaw, or upper belly or in one or both shoulders or arms. ¨ Lightheadedness or sudden weakness. ¨ A fast or irregular heartbeat. After you call 911, the  may tell you to chew 1 adult-strength or 2 to 4 low-dose aspirin. Wait for an ambulance. Do not try to drive yourself. Call your doctor now or seek immediate medical care if:  · You develop new symptoms of aortic valve stenosis, such as chest pain, dizziness, or shortness of breath. · You have new or increased shortness of breath. · You are dizzy or lightheaded, or you feel like you may faint. · You have sudden weight gain, such as more than 2 to 3 pounds in a day or 5 pounds in a week. (Your doctor may suggest a different range of weight gain.)  · You have increased swelling in your legs, ankles, or feet. Watch closely for changes in your health, and be sure to contact your doctor if:  · You have trouble making healthy lifestyle changes. · You want more information about healthy lifestyle changes. Where can you learn more? Go to https://chpeflorentineb.One Codex. org and sign in to your [employer] account. Enter S284 in the [employer] box to learn more about \"Aortic Valve Stenosis: Care Instructions. \"     If you do not have an for at least a few hours to make sure the catheter site starts to heal. You may have a bandage or a compression device on your groin or arm to prevent bleeding. · If the catheter was placed in your groin, you may lie in bed for a few hours. If the catheter was put in your arm, you will need to keep your arm still for at least 1 hour. · You may or may not need to stay in the hospital overnight. You will get more instructions for what to do at home. · Drink plenty of fluids for several hours after the test.  Follow-up care is a key part of your treatment and safety. Be sure to make and go to all appointments, and call your doctor if you are having problems. It's also a good idea to know your test results and keep a list of the medicines you take. Where can you learn more? Go to https://CiespacepeEpicForce.[employer]Corp. org and sign in to your [employer] account. Enter W306 in the Commun[employer] box to learn more about \"Left Heart Catheterization: About This Test.\"     If you do not have an account, please click on the \"Sign Up Now\" link. Current as of: January 12, 2017  Content Version: 11.3  © 6133-6462 [employer]. Care instructions adapted under license by [email or web address] ([facility]). If you have questions about a medical condition or this instruction, always ask your healthcare professional. [place] [address] any warranty or liability for your use of this information. Patient Education        Learning About Percutaneous Coronary Intervention  What is percutaneous coronary intervention? Percutaneous coronary intervention (PCI) is the name for procedures to open a blocked coronary artery. The two most common are coronary angioplasty and coronary stent placement. A PCI is a way to open a blocked coronary artery before, during, or after a heart attack. It gets blood flowing to your heart.  And it can help prevent heart problems by widening an artery that has been narrowed by fatty buildup (plaque). This also may be called balloon angioplasty. Before a PCI, a doctor does a test to find blocked arteries. The test is called cardiac catheterization. The doctor puts a tiny tube called a catheter into an artery in your groin or arm. The doctor moves the catheter through the artery to your heart. Then he or she puts a dye into the catheter. This makes your heart's arteries show up on a screen so the doctor can see any blockages. The test also can measure the pressure inside your heart's chambers. If you have a blocked artery, the doctor may do an angioplasty or a coronary stent procedure. In an angioplasty, the doctor uses a catheter with a tiny balloon at the tip. He or she puts it into the blocked area and inflates it. The balloon presses the plaque against the walls of the artery. This makes more room for blood to flow. In most cases, the doctor then puts a stent in the artery. A stent is a small, expandable tube that presses against the walls of the artery. The stent is left in the artery to keep the artery open. This helps blood flow. It also may keep small pieces of plaque from breaking off and causing a heart attack. How is PCI done? PCI is done in a cardiac catheterization laboratory (\"cath lab\"). It is done by a heart specialist called a cardiologist. The whole procedure may take 1½ to 3 hours. You lie on a table under a large X-ray machine. You will get medicine through an IV in one of your veins. It helps you relax and not feel pain. You will be awake during the procedure. But you may not be able to remember much about it. The doctor will inject some medicine into your arm or groin to numb the skin. You will feel a small needle stick. It's like having a blood test. You may feel some pressure when the doctor puts in the catheter. But you will not feel pain. The doctor will look at X-ray pictures on a monitor (like a TV set) to move the catheter to your heart.  You may feel warm or flushed for a short time when the doctor injects dye into your artery. The doctor then will inflate a tiny balloon at the end of the catheter. The balloon is inflated for a brief time. Then it is deflated and removed. The doctor also may use the catheter to put a stent in the artery. What can you expect after PCI? The catheter will be removed. A nurse or doctor may press on a bandage on the opening. Then a bandage or a compression device may be placed on your groin or wrist at the catheter insertion site. This prevents bleeding. After the test, you will be taken to a room where the catheter site and your heart rate, blood pressure, and temperature will be checked several times. If the catheter was put in your groin, you will need to lie still and keep your leg straight for several hours. If the catheter was put in your arm, you may be able to sit up and get out of bed right away. But you will need to keep your arm still for at least one hour. The average hospital stay is 1 to 2 days for most procedures. When you go home, you will get instructions from your doctor to help you recover well and prevent problems. Make sure to drink plenty of fluids (unless your doctor tells you not to) for several hours after the test. This will help flush the dye out of your body. Your doctor will prescribe blood-thinning medicines. You will likely take aspirin plus another blood thinner. It is very important that you take these medicines exactly as directed. They help keep the coronary artery open and reduce your risk of a heart attack. If you have this procedure, you will still need to make lifestyle changes like eating healthy, being active, and not smoking. This will give you the best chance for a longer, healthier life. Follow-up care is a key part of your treatment and safety. Be sure to make and go to all appointments, and call your doctor if you are having problems.  It's also a good idea to know your test results and keep

## 2017-11-20 NOTE — PROGRESS NOTES
[address]  Cardiology Consult    Everett Montero [facility]  9/16/1930 November 20, 2017    Reason for Referral: Mod-Sever AS    CC: \"I get SOB when I walk\"       Subjective:     History of Present Illness:    Everett Herndon is a 80 y.o. patient with PMH significant for mitral valve stenosis, heart murmur, thyroid disease, hypertension, hyperlipidemia, and has a history of Lupus, osteoarthritis, and cataract     She returns to see us in over 3 years per Dr. Ramsey Mena referral for worsening aortic Stenosis. Dr. Cesilia Velázquez completed an echo on 10/26/17 which reveals moderate to severe aortic stenosis, normal LVEF 55-60%. Her last echo 4/17/14 revealed mild to moderate aortic stenosis with normal LVEF. She presents today in a wheel chair and is accompanied by her son. She reports that she does have CASTRO with walking up a full flight of stairs. She denies having to stop on the way up or stop at the top to rest. She does keep a walker at the top of the steps. She does note that she sits a lot throughout the day. Does not do any light cooking or light house cleaning. She walks around her home with out help. She denies any history of lung disease. She continues to follow Dr. Gayla Francis for left ankle ulcer and cellulitis. She has finished Cipro, zippered jobst stoking in place. She last saw Dr. Gayla Francis on 11/16 and due to return for a 3 week f/u. Patient give history of      No Chest pain, tightness or discomfort.  No Shortness of breath at rest   No Palpitations.  No Syncope ('blackouts', 'faints', 'collapse') or dizziness.    Rno elated cardiovascular history, including transient ischemic attacks, stroke, peripheral arterial disease and peripheral edema   No Patient comorbidities, including coronary ischemia, cardiomyopathy, congestive heart failure, severe lung disease, and chronic kidney disease   Yes Dyspnea on exertion    No Orthopnea:    No Paroxysmal nocturnal dyspnea or breathlessness at rest. gabapentin (NEURONTIN) 100 MG capsule Take 100 mg by mouth 3 times daily   Yes Historical Provider, MD   Multiple Vitamins-Minerals (CENTRUM SILVER 50+WOMEN PO) Take by mouth   Yes Historical Provider, MD   aspirin 81 MG tablet Take 81 mg by mouth daily   Yes Historical Provider, MD   Ascorbic Acid (VITAMIN C PO) Take by mouth   Yes Historical Provider, MD   calcium carbonate 600 MG TABS tablet Take 1 tablet by mouth daily   Yes Historical Provider, MD   memantine (NAMENDA) 10 MG tablet Take 10 mg by mouth 2 times daily   Yes Historical Provider, MD   losartan (COZAAR) 100 MG tablet 50 mg 2 times daily 1/2 tablet 10/20/10  Yes Historical Provider, MD   levothyroxine (SYNTHROID) 25 MCG tablet Take 25 mcg by mouth daily. Yes Historical Provider, MD   primidone (MYSOLINE) 50 MG tablet Take 50 mg by mouth 2 times daily. 1/25/10  Yes Historical Provider, MD        CURRENT Medications:    No current facility-administered medications for this visit. Allergies:  Codeine; Erythromycin; Vicodin [hydrocodone-acetaminophen]; Epinephrine; Calamine; and Nylon     Review of Systems:     Constitutional: negative  Eyes: negative  Ears, nose, mouth, throat, and face: negative  Respiratory: positive for dyspnea on exertion  Cardiovascular: positive for dyspnea  Gastrointestinal: negative  Genitourinary:negative  Integument/breast: negative  Hematologic/lymphatic: negative  Musculoskeletal:negative  Neurological: negative  Behavioral/Psych: negative  Endocrine: negative  Allergic/Immunologic: negative         Objective:     PHYSICAL EXAM:      Vitals:    11/20/17 1356   BP: 122/84   Pulse: 52    Weight: 133 lb (60.3 kg) (shoes on)       General Appearance:  Alert, cooperative, no distress, appears stated age. Head:  Normocephalic, without obvious abnormality, atraumatic. Eyes:  Pupils equal and round. No scleral icterus. Mouth: Moist mucosa, no pharyngeal erythema. Nose: Nares normal. No drainage or sinus tenderness. Neck: Supple, symmetrical, trachea midline. No adenopathy. No tenderness/mass/nodules. No carotid bruit or elevated JVD. Lungs:   Clear to auscultation bilaterally, respirations unlabored. No wheeze, rales, or rhonchi. Chest Wall:  No tenderness or deformity. Heart:  Regular rate, S1/ soft S2    ___2/6 SE murmur. No rub, or gallop. Abdomen:   Soft, non-tender, bowel sounds active. Musculoskeletal: No muscle wasting or digital clubbing. Extremities: Extremities normal, atraumatic. No cyanosis or edema. Pulses: 2+ radial and pedal pulses, symmetric. Skin: No rashes or lesions. Pysch: Normal mood and affect. Alert and oriented x 4. Neurologic: Normal gross motor and sensory exam.       Labs      Lab Results   Component Value Date    WBC 6.8 2017    RBC 3.99 2017    HGB 11.0 2017    HCT 33.9 2017    MCV 84.9 2017    RDW 13.1 2017     2017     Lab Results   Component Value Date     2017    K 4.0 2017     2017    CO2 25 2017    BUN 27 2017    CREATININE 0.9 2017    GFRAA >60 2017    GFRAA >60 2013    AGRATIO 1.1 2013    LABGLOM 59 2017    GLUCOSE 99 2017    PROT 7.2 2013    PROT 6.8 2012    CALCIUM 9.0 2017    BILITOT 0.30 2013    ALKPHOS 61 2013    AST 25 2013    ALT 16 2013      No results found for: PTINR  :No results found for: LABA1C  No results found for: CKTOTAL, CKMB, CKMBINDEX, TROPONINI    Cardiac, Vascular and Imaging Data     EK17 SB with 1st degree AVB, 50 bpm     Echocardiogram 2014  Concentric LVH with normal systolic function. Ejection fraction is visually  estimated to be 55-60 %. Mild mitral regurgitation is present. Mild to moderate Aortic stenosis. Mean gradient of 27 mmHg. Trace Aortic regurgitation. There is trivial tricuspid regurgitation with RVSP estimated at 31 mmHg.     Bilateral lower extremity arterial doppler ultrasound 04/17/2014  Right Impression There is an MARIANNA of 1.00 on the right. This is suggestive of no significant disease at rest. The majority of the waveforms are multiphasic throughout the right lower extremity. Ultrasound images of the right lower extremity reveal no evidence of a hemodynamically significant stenosis. Left Impression There is an MARIANNA of 1.07 on the left. This is suggestive of no significant disease at rest. The majority of the waveforms are multiphasic throughout the left lower extremity. Ultrasound images of the left lower extremity reveal no evidence of a hemodynamically significant stenosis.      Aabdominal aortic ultrasound 4/17/2014  Exam indication peripheral vascular disease aortic stenosis. The proximal abdominal aorta measures 1.6 x 1.8 cm. The mid abdominal aorta measures 1.4 x 1.5 cm. The distal abdominal aorta measures 1.2 x 1.4 cm. The common iliac arteries measure up to 7 mm in diameter. The aortoiliac arteries have smooth margins.      Echo 10/26/17   Summary   Left ventricle size is normal.   Normal left ventricular wall thickness.   Ejection fraction is visually estimated to be 55-60 %.   The aortic valve is thickened/calcified with a mean gradient of 39 mmHg c/w   moderate-severe aortic stenosis.   Mild aortic regurgitation is present.         Assessment and Plan     -Moderate to severe Aortic Stenosis  Reviewed echo from 2014 to now 10/2017  Complaints of CASTRO especially walking up stairs (she does not have to stop) has walker at the top of the steps  Very sedentary throughout the day- she is not doing light cleaning or light cooking   She does report that she walks around at home  She is bathing herself at the sink currently secondary to left ankle healing per Dr. Jose Maria Toledo   Denies near syncope, syncope, CP   HER STS risk is 4.5%- preliminary   We discussed BAV prior to TAVR vs SAVR.    Would like to proceed with TAVR workup   Presents today in a

## 2017-11-27 ENCOUNTER — TELEPHONE (OUTPATIENT)
Dept: CARDIOLOGY CLINIC | Age: 82
End: 2017-11-27

## 2017-11-28 NOTE — TELEPHONE ENCOUNTER
I called and spoke to [facility], we scheduled Sangeetha's procedure. Initially they had wanted to wait until after the first of the year, but now they want it in December. Procedure scheduled  12/18/17  11:30am          Pt to arrive & report to [facility] cath lab  10:00am  Pre procedure labs due (12/7-12/17)    NPO Midnight  Ok to take all med's in am with sip of water. Please take 4 / 81 mg Baby Aspirin tablets in am day of the procedure.  at discharge  If you go home the same day as your procedure someone needs to stay with you overnight.        [facility]  expressed understanding

## 2017-12-06 ENCOUNTER — TELEPHONE (OUTPATIENT)
Dept: INTERNAL MEDICINE CLINIC | Age: 82
End: 2017-12-06

## 2017-12-07 ENCOUNTER — TELEPHONE (OUTPATIENT)
Dept: INTERNAL MEDICINE CLINIC | Age: 82
End: 2017-12-07

## 2017-12-07 RX ORDER — LOSARTAN POTASSIUM 100 MG/1
50 TABLET ORAL 2 TIMES DAILY
Qty: 30 TABLET | Refills: 12 | Status: SHIPPED | OUTPATIENT
Start: 2017-12-07 | End: 2018-08-07

## 2017-12-07 RX ORDER — GABAPENTIN 100 MG/1
100 CAPSULE ORAL 3 TIMES DAILY
Qty: 90 CAPSULE | Refills: 12 | Status: SHIPPED | OUTPATIENT
Start: 2017-12-07 | End: 2019-01-22 | Stop reason: SDUPTHER

## 2017-12-07 RX ORDER — CITALOPRAM 20 MG/1
20 TABLET ORAL DAILY
Qty: 30 TABLET | Refills: 12 | Status: SHIPPED | OUTPATIENT
Start: 2017-12-07 | End: 2018-12-23 | Stop reason: SDUPTHER

## 2017-12-07 RX ORDER — PRIMIDONE 50 MG/1
50 TABLET ORAL 2 TIMES DAILY
Qty: 60 TABLET | Refills: 12 | Status: SHIPPED | OUTPATIENT
Start: 2017-12-07 | End: 2018-12-23 | Stop reason: SDUPTHER

## 2017-12-07 RX ORDER — LEVOTHYROXINE SODIUM 0.03 MG/1
25 TABLET ORAL DAILY
Qty: 30 TABLET | Refills: 12 | Status: SHIPPED | OUTPATIENT
Start: 2017-12-07 | End: 2018-12-23 | Stop reason: SDUPTHER

## 2017-12-07 RX ORDER — DILTIAZEM HYDROCHLORIDE 120 MG/1
120 CAPSULE, COATED, EXTENDED RELEASE ORAL DAILY
Qty: 30 CAPSULE | Refills: 12 | Status: ON HOLD | OUTPATIENT
Start: 2017-12-07 | End: 2018-02-21 | Stop reason: HOSPADM

## 2017-12-12 ENCOUNTER — OFFICE VISIT (OUTPATIENT)
Dept: CARDIOLOGY CLINIC | Age: 82
End: 2017-12-12

## 2017-12-12 VITALS
BODY MASS INDEX: 21.83 KG/M2 | OXYGEN SATURATION: 97 % | HEIGHT: 65 IN | DIASTOLIC BLOOD PRESSURE: 78 MMHG | HEART RATE: 57 BPM | WEIGHT: 131 LBS | SYSTOLIC BLOOD PRESSURE: 124 MMHG

## 2017-12-12 DIAGNOSIS — I35.0 MODERATE TO SEVERE AORTIC STENOSIS: ICD-10-CM

## 2017-12-12 DIAGNOSIS — R06.09 DOE (DYSPNEA ON EXERTION): ICD-10-CM

## 2017-12-12 DIAGNOSIS — I35.0 SEVERE AORTIC VALVE STENOSIS: Primary | ICD-10-CM

## 2017-12-12 LAB
ANION GAP SERPL CALCULATED.3IONS-SCNC: 14 MMOL/L (ref 3–16)
BUN BLDV-MCNC: 33 MG/DL (ref 7–20)
CALCIUM SERPL-MCNC: 9.5 MG/DL (ref 8.3–10.6)
CHLORIDE BLD-SCNC: 104 MMOL/L (ref 99–110)
CO2: 26 MMOL/L (ref 21–32)
CREAT SERPL-MCNC: 1 MG/DL (ref 0.6–1.2)
GFR AFRICAN AMERICAN: >60
GFR NON-AFRICAN AMERICAN: 52
GLUCOSE BLD-MCNC: 75 MG/DL (ref 70–99)
HCT VFR BLD CALC: 35.5 % (ref 36–48)
HEMOGLOBIN: 11.8 G/DL (ref 12–16)
INR BLD: 1.04 (ref 0.85–1.15)
MCH RBC QN AUTO: 28.4 PG (ref 26–34)
MCHC RBC AUTO-ENTMCNC: 33.2 G/DL (ref 31–36)
MCV RBC AUTO: 85.6 FL (ref 80–100)
PDW BLD-RTO: 13.9 % (ref 12.4–15.4)
PLATELET # BLD: 182 K/UL (ref 135–450)
PMV BLD AUTO: 7.7 FL (ref 5–10.5)
POTASSIUM SERPL-SCNC: 4.5 MMOL/L (ref 3.5–5.1)
PROTHROMBIN TIME: 11.7 SEC (ref 9.6–13)
RBC # BLD: 4.15 M/UL (ref 4–5.2)
SODIUM BLD-SCNC: 144 MMOL/L (ref 136–145)
WBC # BLD: 6.7 K/UL (ref 4–11)

## 2017-12-12 PROCEDURE — 1123F ACP DISCUSS/DSCN MKR DOCD: CPT | Performed by: INTERNAL MEDICINE

## 2017-12-12 PROCEDURE — G8484 FLU IMMUNIZE NO ADMIN: HCPCS | Performed by: INTERNAL MEDICINE

## 2017-12-12 PROCEDURE — G8427 DOCREV CUR MEDS BY ELIG CLIN: HCPCS | Performed by: INTERNAL MEDICINE

## 2017-12-12 PROCEDURE — G8420 CALC BMI NORM PARAMETERS: HCPCS | Performed by: INTERNAL MEDICINE

## 2017-12-12 PROCEDURE — 99215 OFFICE O/P EST HI 40 MIN: CPT | Performed by: INTERNAL MEDICINE

## 2017-12-12 PROCEDURE — 1036F TOBACCO NON-USER: CPT | Performed by: INTERNAL MEDICINE

## 2017-12-12 PROCEDURE — 4040F PNEUMOC VAC/ADMIN/RCVD: CPT | Performed by: INTERNAL MEDICINE

## 2017-12-12 PROCEDURE — 1090F PRES/ABSN URINE INCON ASSESS: CPT | Performed by: INTERNAL MEDICINE

## 2017-12-12 NOTE — PROGRESS NOTES
[address]  Cardiology Progress Note    Farrukh Alvarenga [facility]  9/16/1930 December 12, 2017    Reason for Referral: Mod-Sever AS    CC: \"We would like to discuss plan\"       Subjective:     History of Present Illness:    Anna Carroll is a 80 y.o. patient with PMH significant for mitral valve stenosis, heart murmur, thyroid disease, hypertension, hyperlipidemia, and has a history of Lupus, osteoarthritis, and cataract     She returns to see us in over 3 years per Dr. Elida Rocha referral for worsening aortic Stenosis. Dr. Liu Alarcon completed an echo on 10/26/17 which reveals moderate to severe aortic stenosis, normal LVEF 55-60%. Her last echo 4/17/14 revealed mild to moderate aortic stenosis with normal LVEF. She presents today in a wheel chair and is accompanied by her son. She reports that she does have CASTRO with walking up a full flight of stairs. She denies having to stop on the way up or stop at the top to rest. She does keep a walker at the top of the steps. She does note that she sits a lot throughout the day. Does not do any light cooking or light house cleaning. She walks around her home with out help. She denies any history of lung disease. She continues to follow Dr. Sunshine Morin for left ankle ulcer and cellulitis. She has finished Cipro, zippered jobst stoking in place. She last saw Dr. Sunshine Morin on 11/16 and due to return for a 3 week f/u. Today, returns upon request to review the plan moving forward for her mod-sev aortic stenosis with proceeding with [facility] with aortic valve study. She denies any changes in her symptoms since we saw her a few weeks ago. She reports compliance with medical therapy and feels she continues to tolerating. No falls. No abnormal bruising or bleeding. Wheel chair today and she is once again accompanied by her family-son. She is scheduled in follow up with Dr. Sunshine Morin this Thursday 12/14/17 for her left ankle healing ulcer.      Patient give history of      No Chest pain, No results found for: PTINR  :No results found for: LABA1C  No results found for: CKTOTAL, CKMB, CKMBINDEX, TROPONINI    Cardiac, Vascular and Imaging Data     EK17 SB with 1st degree AVB, 50 bpm     Echocardiogram 2014  Concentric LVH with normal systolic function. Ejection fraction is visually  estimated to be 55-60 %. Mild mitral regurgitation is present. Mild to moderate Aortic stenosis. Mean gradient of 27 mmHg. Trace Aortic regurgitation. There is trivial tricuspid regurgitation with RVSP estimated at 31 mmHg. Bilateral lower extremity arterial doppler ultrasound 2014  Right Impression There is an MARIANNA of 1.00 on the right. This is suggestive of no significant disease at rest. The majority of the waveforms are multiphasic throughout the right lower extremity. Ultrasound images of the right lower extremity reveal no evidence of a hemodynamically significant stenosis. Left Impression There is an MARIANNA of 1.07 on the left. This is suggestive of no significant disease at rest. The majority of the waveforms are multiphasic throughout the left lower extremity. Ultrasound images of the left lower extremity reveal no evidence of a hemodynamically significant stenosis.      Aabdominal aortic ultrasound 2014  Exam indication peripheral vascular disease aortic stenosis. The proximal abdominal aorta measures 1.6 x 1.8 cm. The mid abdominal aorta measures 1.4 x 1.5 cm. The distal abdominal aorta measures 1.2 x 1.4 cm. The common iliac arteries measure up to 7 mm in diameter.  The aortoiliac arteries have smooth margins.      Echo 10/26/17   Summary   Left ventricle size is normal.   Normal left ventricular wall thickness.   Ejection fraction is visually estimated to be 55-60 %.   The aortic valve is thickened/calcified with a mean gradient of 39 mmHg c/w   moderate-severe aortic stenosis.   Mild aortic regurgitation is present.         Assessment and Plan     -Moderate to severe Aortic

## 2017-12-12 NOTE — PATIENT INSTRUCTIONS
Patient Education   Procedure scheduled  12/18/17  11:30am   Pt to arrive & report to [place] cath lab  10:00am  Pre procedure labs today      No food or drink after Midnight the night before   Okay to take all med's in am with sip of water. Please take 4 / 81 mg Baby Aspirin tablets in am day of the procedure.  at discharge  If you go home the same day as your procedure someone needs to stay with you overnight.         Aortic Valve Stenosis: Care Instructions  Your Care Instructions    Having aortic valve stenosis means that the valve between your heart and the large blood vessel that carries blood to the body (aorta) has narrowed. That forces the heart to pump harder to get enough blood through the valve. As stenosis gets worse, the valve gets narrower. This can cause symptoms. Symptoms include chest pain, dizziness, fainting, or shortness of breath. Surgery can fix the valve. The most common surgery is to replace the aortic valve. Your doctor may want to delay valve replacement until you have severe narrowing. Follow-up care is a key part of your treatment and safety. Be sure to make and go to all appointments, and call your doctor if you are having problems. It's also a good idea to know your test results and keep a list of the medicines you take. How can you care for yourself at home? · Be safe with medicines. Take your medicines exactly as prescribed. Call your doctor if you think you are having a problem with your medicine. You will get more details on the specific medicines your doctor prescribes. · Plan your meals so that you are eating heart-healthy foods. ¨ Eat a variety of foods daily. Fresh fruits and vegetables and whole grains are good choices. ¨ Limit your fat intake, especially saturated and trans fat. ¨ Limit salt (sodium). ¨ Increase fiber in your diet. ¨ Limit alcohol. · Be active. Ask your doctor what type and level of exercise is safe for you. Walking is a good choice.  Your doctor may suggest that you join a cardiac rehabilitation program so that you can have help increasing your physical activity safely. · Do not smoke. Smoking can make aortic valve stenosis worse. If you need help quitting, talk to your doctor about stop-smoking programs and medicines. These can increase your chances of quitting for good. · Stay at a healthy weight. Lose weight if you need to. · Avoid colds and flu. Get a pneumococcal vaccine shot. If you have had one before, ask your doctor if you need another dose. Get a flu vaccine every year. · Take care of your teeth and gums. Get regular dental checkups. Good dental health is important because bacteria can spread from infected teeth and gums to the heart valves. When should you call for help? Call 911 anytime you think you may need emergency care. For example, call if:  ? · You passed out (lost consciousness). ? · You have symptoms of a heart attack. These may include:  ¨ Chest pain or pressure, or a strange feeling in the chest.  ¨ Sweating. ¨ Shortness of breath. ¨ Nausea or vomiting. ¨ Pain, pressure, or a strange feeling in the back, neck, jaw, or upper belly or in one or both shoulders or arms. ¨ Lightheadedness or sudden weakness. ¨ A fast or irregular heartbeat. ? After you call 911, the  may tell you to chew 1 adult-strength or 2 to 4 low-dose aspirin. Wait for an ambulance. Do not try to drive yourself. ?Call your doctor now or seek immediate medical care if:  ? · You develop new symptoms of aortic valve stenosis, such as chest pain, dizziness, or shortness of breath. ? · You have new or increased shortness of breath. ? · You are dizzy or lightheaded, or you feel like you may faint. ? · You have sudden weight gain, such as more than 2 to 3 pounds in a day or 5 pounds in a week. (Your doctor may suggest a different range of weight gain.)   ? · You have increased swelling in your legs, ankles, or feet. ? Watch closely for changes in your health, and be sure to contact your doctor if:  ? · You have trouble making healthy lifestyle changes. ? · You want more information about healthy lifestyle changes. Where can you learn more? Go to [email or web address] org and sign in to your [employer] account. Enter X458 in the [facility] box to learn more about \"Aortic Valve Stenosis: Care Instructions. \"     If you do not have an account, please click on the \"Sign Up Now\" link. Current as of: September 21, 2016  Content Version: 11.4  © 4229-1291 [employer]. Care instructions adapted under license by [facility]). If you have questions about a medical condition or this instruction, always ask your healthcare professional. [address] any warranty or liability for your use of this information. Patient Education        Left Heart Catheterization: About This Test  What is it? Cardiac catheterization is a test to check the left side of your heart. Your doctor might look at the shape of your heart, the motion of your heart, or the blood pressure inside the chambers. Why is this test done? This test gives information about how your heart is working. It can:  · Check blood flow and blood pressure in the chambers of the heart. · Check the pumping action of the heart. · Find out if a heart defect is present and how severe it is. · Find out how well the heart valves work. What happens during the test?  · You will get medicine to help you relax. · A thin tube called a catheter is put into a blood vessel in the groin or the arm. The doctor moves the catheter through the blood vessel into your heart. · You will get a shot to numb the skin where the catheter goes in. You may feel pressure when the doctor moves the catheter through your blood vessel into your heart. · Dye may be injected into your heart.  Your doctor can watch on special monitors as the dye moves in your

## 2017-12-14 ENCOUNTER — TELEPHONE (OUTPATIENT)
Dept: INTERNAL MEDICINE CLINIC | Age: 82
End: 2017-12-14

## 2017-12-14 ENCOUNTER — OFFICE VISIT (OUTPATIENT)
Dept: VASCULAR SURGERY | Age: 82
End: 2017-12-14

## 2017-12-14 VITALS
BODY MASS INDEX: 23.66 KG/M2 | HEIGHT: 65 IN | DIASTOLIC BLOOD PRESSURE: 68 MMHG | WEIGHT: 142 LBS | SYSTOLIC BLOOD PRESSURE: 128 MMHG

## 2017-12-14 PROCEDURE — 99212 OFFICE O/P EST SF 10 MIN: CPT | Performed by: SURGERY

## 2017-12-14 PROCEDURE — 4040F PNEUMOC VAC/ADMIN/RCVD: CPT | Performed by: SURGERY

## 2017-12-14 PROCEDURE — 1036F TOBACCO NON-USER: CPT | Performed by: SURGERY

## 2017-12-14 PROCEDURE — G8484 FLU IMMUNIZE NO ADMIN: HCPCS | Performed by: SURGERY

## 2017-12-14 PROCEDURE — 1123F ACP DISCUSS/DSCN MKR DOCD: CPT | Performed by: SURGERY

## 2017-12-14 PROCEDURE — G8427 DOCREV CUR MEDS BY ELIG CLIN: HCPCS | Performed by: SURGERY

## 2017-12-14 PROCEDURE — 1090F PRES/ABSN URINE INCON ASSESS: CPT | Performed by: SURGERY

## 2017-12-14 PROCEDURE — G8420 CALC BMI NORM PARAMETERS: HCPCS | Performed by: SURGERY

## 2017-12-14 NOTE — TELEPHONE ENCOUNTER
Patient's rash is back on chest area. She needs a refill for anti-fungal cream.   It is generic Mycolog II cream.     [facility][phone]  Theresa Collins.

## 2017-12-18 PROBLEM — I35.0 AORTIC STENOSIS, SEVERE: Status: ACTIVE | Noted: 2017-12-18

## 2017-12-20 ENCOUNTER — TELEPHONE (OUTPATIENT)
Dept: CARDIOLOGY CLINIC | Age: 82
End: 2017-12-20

## 2017-12-20 DIAGNOSIS — I35.0 NONRHEUMATIC AORTIC VALVE STENOSIS: Primary | ICD-10-CM

## 2017-12-26 ENCOUNTER — TELEPHONE (OUTPATIENT)
Dept: CARDIOLOGY CLINIC | Age: 82
End: 2017-12-26

## 2017-12-26 NOTE — TELEPHONE ENCOUNTER
Pts son, Namrata Cifuentes, called today stating that his mother's weight had increased from 127.6 to 132.2 in the past 2 days. States that she is more SOB. Denies LE edema, chest pain. Denies excessive salt intake. Discussed with Dr. Tara Domínguez. Phoned in lasix 20mg QD to [address] [place] [address] at [phone]. Namrata Cifuentes verbalized understanding to start lasix today and to take daily. Instructed to call me with any questions/concerns.  Carl WRIGHT

## 2017-12-28 RX ORDER — FUROSEMIDE 20 MG/1
20 TABLET ORAL DAILY
Qty: 30 TABLET | Refills: 3
Start: 2017-12-28 | End: 2018-01-25 | Stop reason: ALTCHOICE

## 2017-12-29 ENCOUNTER — TELEPHONE (OUTPATIENT)
Dept: INTERNAL MEDICINE CLINIC | Age: 82
End: 2017-12-29

## 2018-01-03 ENCOUNTER — TELEPHONE (OUTPATIENT)
Dept: INTERNAL MEDICINE CLINIC | Age: 83
End: 2018-01-03

## 2018-01-11 ENCOUNTER — HOSPITAL ENCOUNTER (OUTPATIENT)
Dept: CT IMAGING | Age: 83
Discharge: HOME | End: 2018-01-11
Admitting: INTERNAL MEDICINE

## 2018-01-11 ENCOUNTER — OFFICE VISIT (OUTPATIENT)
Dept: CARDIOLOGY CLINIC | Age: 83
End: 2018-01-11

## 2018-01-11 VITALS
SYSTOLIC BLOOD PRESSURE: 170 MMHG | HEIGHT: 64 IN | BODY MASS INDEX: 21.89 KG/M2 | DIASTOLIC BLOOD PRESSURE: 80 MMHG | HEART RATE: 60 BPM | WEIGHT: 128.2 LBS

## 2018-01-11 DIAGNOSIS — I35.0 NONRHEUMATIC AORTIC VALVE STENOSIS: ICD-10-CM

## 2018-01-11 DIAGNOSIS — I73.9 PVD (PERIPHERAL VASCULAR DISEASE) (HCC): ICD-10-CM

## 2018-01-11 DIAGNOSIS — I10 ESSENTIAL HYPERTENSION: ICD-10-CM

## 2018-01-11 DIAGNOSIS — I35.0 NONRHEUMATIC AORTIC VALVE STENOSIS: Primary | ICD-10-CM

## 2018-01-11 DIAGNOSIS — E78.00 HYPERCHOLESTEREMIA: ICD-10-CM

## 2018-01-11 DIAGNOSIS — I35.9 NONRHEUMATIC AORTIC VALVE DISORDER: ICD-10-CM

## 2018-01-11 PROCEDURE — G8428 CUR MEDS NOT DOCUMENT: HCPCS | Performed by: INTERNAL MEDICINE

## 2018-01-11 PROCEDURE — 1123F ACP DISCUSS/DSCN MKR DOCD: CPT | Performed by: INTERNAL MEDICINE

## 2018-01-11 PROCEDURE — 4040F PNEUMOC VAC/ADMIN/RCVD: CPT | Performed by: INTERNAL MEDICINE

## 2018-01-11 PROCEDURE — G8420 CALC BMI NORM PARAMETERS: HCPCS | Performed by: INTERNAL MEDICINE

## 2018-01-11 PROCEDURE — 1090F PRES/ABSN URINE INCON ASSESS: CPT | Performed by: INTERNAL MEDICINE

## 2018-01-11 PROCEDURE — G8484 FLU IMMUNIZE NO ADMIN: HCPCS | Performed by: INTERNAL MEDICINE

## 2018-01-11 PROCEDURE — 1036F TOBACCO NON-USER: CPT | Performed by: INTERNAL MEDICINE

## 2018-01-11 PROCEDURE — 99214 OFFICE O/P EST MOD 30 MIN: CPT | Performed by: INTERNAL MEDICINE

## 2018-01-25 ENCOUNTER — OFFICE VISIT (OUTPATIENT)
Dept: CARDIOTHORACIC SURGERY | Age: 83
End: 2018-01-25

## 2018-01-25 VITALS
SYSTOLIC BLOOD PRESSURE: 130 MMHG | BODY MASS INDEX: 22.4 KG/M2 | HEIGHT: 64 IN | TEMPERATURE: 97.5 F | OXYGEN SATURATION: 98 % | WEIGHT: 131.2 LBS | HEART RATE: 52 BPM | DIASTOLIC BLOOD PRESSURE: 70 MMHG

## 2018-01-25 DIAGNOSIS — I35.0 AORTIC STENOSIS, SEVERE: Primary | ICD-10-CM

## 2018-01-25 PROCEDURE — 99214 OFFICE O/P EST MOD 30 MIN: CPT | Performed by: THORACIC SURGERY (CARDIOTHORACIC VASCULAR SURGERY)

## 2018-01-26 NOTE — PROGRESS NOTES
Normocephalic, atraumatic, normal gums, & palate    Neck:  supple, symmetrical, trachea midline, no lymphadenopathy, no jugular venous distension    Lungs:  no increased work of breathing, good air exchange, no retractions and clear to auscultation, no crackles or wheezing, no palpable / percussible abnormalities    Cardiovascular:  normal apical impulse, regular rate and rhythm and systolic murmur: holosystolic 4/6, harsh throughout the precordium, loudest in right 2nd ICS    Pulses:  Right dorsalis pedis 1, Left dorsalis pedis 1, Right posterior tibial 1, Left Posterior tibial 1, Right Femoral 2, Left Femoral 2, Right radial 1, and Left radial 1    Abdomen:  Soft, normal bowel sounds, non-tender, no hepatosplenomegaly, aorta normal and bruits absent    Musculoskeletal:  Back is straight and non-tender, full ROM of upper and lower extremities. Extremities:   No clubbing, cyanosis, or edema     Skin: warm and normal turgor, no ulcers, infections, or rashes, no jaundice    Neurological: awake, alert and oriented x 3, motor 5/5 bilateral upper and lower extremities, sensation grossly intact, intention tremor in right upper extremity    Psychiatric: Mood and affect appear appropriate        ASSESSMENT AND PLAN:    Severe symptomatic AS, CHF, lupus, frail with diagnosis of dementia    Agree best option TAVR. I answered all the patient and her son's questions. They may not be interested in transapical approach if needed (I have not seen 3D recons of access). Continue TAVR work-up.     Valentino Lies, MD

## 2018-01-29 ENCOUNTER — OFFICE VISIT (OUTPATIENT)
Dept: CARDIOLOGY CLINIC | Age: 83
End: 2018-01-29

## 2018-01-29 VITALS
HEIGHT: 64 IN | HEART RATE: 60 BPM | BODY MASS INDEX: 22.36 KG/M2 | OXYGEN SATURATION: 93 % | DIASTOLIC BLOOD PRESSURE: 70 MMHG | WEIGHT: 131 LBS | SYSTOLIC BLOOD PRESSURE: 114 MMHG

## 2018-01-29 DIAGNOSIS — I35.0 NONRHEUMATIC AORTIC VALVE STENOSIS: Primary | ICD-10-CM

## 2018-01-29 PROCEDURE — G8427 DOCREV CUR MEDS BY ELIG CLIN: HCPCS | Performed by: INTERNAL MEDICINE

## 2018-01-29 PROCEDURE — 1123F ACP DISCUSS/DSCN MKR DOCD: CPT | Performed by: INTERNAL MEDICINE

## 2018-01-29 PROCEDURE — 4040F PNEUMOC VAC/ADMIN/RCVD: CPT | Performed by: INTERNAL MEDICINE

## 2018-01-29 PROCEDURE — G8484 FLU IMMUNIZE NO ADMIN: HCPCS | Performed by: INTERNAL MEDICINE

## 2018-01-29 PROCEDURE — 1036F TOBACCO NON-USER: CPT | Performed by: INTERNAL MEDICINE

## 2018-01-29 PROCEDURE — G8420 CALC BMI NORM PARAMETERS: HCPCS | Performed by: INTERNAL MEDICINE

## 2018-01-29 PROCEDURE — 99214 OFFICE O/P EST MOD 30 MIN: CPT | Performed by: INTERNAL MEDICINE

## 2018-01-29 PROCEDURE — 1090F PRES/ABSN URINE INCON ASSESS: CPT | Performed by: INTERNAL MEDICINE

## 2018-01-29 RX ORDER — FUROSEMIDE 20 MG/1
20 TABLET ORAL DAILY
COMMUNITY
End: 2018-03-01 | Stop reason: CLARIF

## 2018-01-29 NOTE — PROGRESS NOTES
stenosis.   Mild aortic regurgitation is present. [place] [facility] with aortic valve study 12/18/17  ANGIOGRAPHIC FINDINGS:  1. Left main is normal with PRAVEEN III flow. No stenosis. 2.  Left anterior descending artery comes from left main coronary. It has  no significant stenosis seen, patent diagonal branches. PRAVEEN III flow. 3.  Left circumflex comes from left main coronary. It is patent codominant  with patent obtuse marginal branches and PRAVEEN III flow. 4.  Right coronary comes from right coronary cusp, giving rise to right  posterior descending artery posterolateral branch, right codominant,  patent. No stenosis seen. [facility] FINDINGS:  1. Aortic valve gradient is 41.1 mmHg. 2.  LVEDP was 20 mmHg. 3.  LV ejection fraction of 60%.  SUMMARY:  Severe aortic valve stenosis. Carotid Duplex, bilateral 12/19/17     Right Impression   The right internal carotid artery appears to have a 0-15% diameter reducing   stenosis based on velocity criteria. The right vertebral artery demonstrates normal antegrade flow. Left Impression   The left internal carotid artery appears to have a 0-15% diameter reducing   stenosis based on velocity criteria. The left vertebral artery demonstrates normal antegrade flow. CTA chest, abd, pel w contrast 1/11/18  Aortic valve calcification.  No aortic aneurysm noted.       Scattered areas of mosaic attenuation are seen in the lungs, raising the   question of air trapping.       Cholelithiasis       Mild atherosclerosis of abdominal aorta.  No aneurysm noted         PFTs 12/19/17  Overall Interpretation  She was not able to complete spirometry, lung volumes, diffusion   capacity properly.  The best spirometry has FEV1 of 93% without   obstruction.        Assessment and Plan     -Severe Aortic Stenosis  Echo from 2014 to now 10/2017 have been reviewed  No changes from prior office visit    Complaints of CASTRO especially walking up stairs (she does not have to stop) has walker at

## 2018-02-08 ENCOUNTER — OFFICE VISIT (OUTPATIENT)
Dept: VASCULAR SURGERY | Age: 83
End: 2018-02-08

## 2018-02-08 VITALS
WEIGHT: 131 LBS | BODY MASS INDEX: 22.36 KG/M2 | SYSTOLIC BLOOD PRESSURE: 122 MMHG | DIASTOLIC BLOOD PRESSURE: 70 MMHG | HEIGHT: 64 IN

## 2018-02-08 DIAGNOSIS — I87.332 STASIS DERMATITIS OF LEFT LOWER EXTREMITY WITH VENOUS ULCER DUE TO CHRONIC PERIPHERAL VENOUS HYPERTENSION (HCC): ICD-10-CM

## 2018-02-08 DIAGNOSIS — L97.929 STASIS DERMATITIS OF LEFT LOWER EXTREMITY WITH VENOUS ULCER DUE TO CHRONIC PERIPHERAL VENOUS HYPERTENSION (HCC): ICD-10-CM

## 2018-02-08 PROCEDURE — G8484 FLU IMMUNIZE NO ADMIN: HCPCS | Performed by: SURGERY

## 2018-02-08 PROCEDURE — 1036F TOBACCO NON-USER: CPT | Performed by: SURGERY

## 2018-02-08 PROCEDURE — 1123F ACP DISCUSS/DSCN MKR DOCD: CPT | Performed by: SURGERY

## 2018-02-08 PROCEDURE — 1090F PRES/ABSN URINE INCON ASSESS: CPT | Performed by: SURGERY

## 2018-02-08 PROCEDURE — G8427 DOCREV CUR MEDS BY ELIG CLIN: HCPCS | Performed by: SURGERY

## 2018-02-08 PROCEDURE — 99212 OFFICE O/P EST SF 10 MIN: CPT | Performed by: SURGERY

## 2018-02-08 PROCEDURE — 4040F PNEUMOC VAC/ADMIN/RCVD: CPT | Performed by: SURGERY

## 2018-02-08 PROCEDURE — G8420 CALC BMI NORM PARAMETERS: HCPCS | Performed by: SURGERY

## 2018-02-08 NOTE — PROGRESS NOTES
Seen today for L ankle wounds in the past. Now wearing [address] stocking as instructed. No fever or chills. Notes no drainage. Remains scabed over. Compliance with elevation is doubtful. Leg feels better overall. EXAM:  Edema well controlled. Redness resolved. Lateral ankle smal scab - adherent and without drainage    A/P: CVI - stasis dermatitis and cellulitis resolved   Noncompliance - pt and son. Continue stocking use as directed in the past.   Will proceed with TAVR evaluation/treatment since no active infection currently. F/U 2-4 weeks after TAVR.

## 2018-02-09 ENCOUNTER — HOSPITAL ENCOUNTER (OUTPATIENT)
Dept: OTHER | Age: 83
Discharge: HOME | End: 2018-02-09
Attending: INTERNAL MEDICINE | Admitting: INTERNAL MEDICINE

## 2018-02-09 DIAGNOSIS — Z01.811 PRE-OP CHEST EXAM: ICD-10-CM

## 2018-02-09 LAB
ABO/RH: NORMAL
ALBUMIN SERPL-MCNC: 3.8 G/DL (ref 3.4–5)
ALP BLD-CCNC: 64 U/L (ref 40–129)
ALT SERPL-CCNC: 14 U/L (ref 10–40)
ANION GAP SERPL CALCULATED.3IONS-SCNC: 10 MMOL/L (ref 3–16)
ANTIBODY SCREEN: NORMAL
AST SERPL-CCNC: 19 U/L (ref 15–37)
BASOPHILS ABSOLUTE: 0 K/UL (ref 0–0.2)
BASOPHILS RELATIVE PERCENT: 0.3 %
BILIRUB SERPL-MCNC: 0.3 MG/DL (ref 0–1)
BILIRUBIN DIRECT: <0.2 MG/DL (ref 0–0.3)
BILIRUBIN URINE: NEGATIVE
BILIRUBIN, INDIRECT: NORMAL MG/DL (ref 0–1)
BLOOD, URINE: NEGATIVE
BUN BLDV-MCNC: 18 MG/DL (ref 7–20)
CALCIUM SERPL-MCNC: 9.3 MG/DL (ref 8.3–10.6)
CHLORIDE BLD-SCNC: 107 MMOL/L (ref 99–110)
CLARITY: CLEAR
CO2: 26 MMOL/L (ref 21–32)
COLOR: YELLOW
CREAT SERPL-MCNC: 0.7 MG/DL (ref 0.6–1.2)
EOSINOPHILS ABSOLUTE: 0.3 K/UL (ref 0–0.6)
EOSINOPHILS RELATIVE PERCENT: 4.6 %
EPITHELIAL CELLS, UA: 2 /HPF (ref 0–5)
GFR AFRICAN AMERICAN: >60
GFR NON-AFRICAN AMERICAN: >60
GLUCOSE BLD-MCNC: 95 MG/DL (ref 70–99)
GLUCOSE URINE: NEGATIVE MG/DL
HCT VFR BLD CALC: 33.9 % (ref 36–48)
HEMOGLOBIN: 11.5 G/DL (ref 12–16)
HYALINE CASTS: 5 /LPF (ref 0–8)
INR BLD: 1.05 (ref 0.85–1.15)
KETONES, URINE: ABNORMAL MG/DL
LEUKOCYTE ESTERASE, URINE: ABNORMAL
LYMPHOCYTES ABSOLUTE: 0.8 K/UL (ref 1–5.1)
LYMPHOCYTES RELATIVE PERCENT: 13.4 %
MAGNESIUM: 1.9 MG/DL (ref 1.8–2.4)
MCH RBC QN AUTO: 28.3 PG (ref 26–34)
MCHC RBC AUTO-ENTMCNC: 33.9 G/DL (ref 31–36)
MCV RBC AUTO: 83.5 FL (ref 80–100)
MICROSCOPIC EXAMINATION: YES
MONOCYTES ABSOLUTE: 0.5 K/UL (ref 0–1.3)
MONOCYTES RELATIVE PERCENT: 8.4 %
NEUTROPHILS ABSOLUTE: 4.6 K/UL (ref 1.7–7.7)
NEUTROPHILS RELATIVE PERCENT: 73.3 %
NITRITE, URINE: NEGATIVE
PDW BLD-RTO: 13.5 % (ref 12.4–15.4)
PH UA: 6.5
PLATELET # BLD: 155 K/UL (ref 135–450)
PMV BLD AUTO: 7.7 FL (ref 5–10.5)
POTASSIUM SERPL-SCNC: 3.7 MMOL/L (ref 3.5–5.1)
PRO-BNP: 395 PG/ML (ref 0–449)
PROTEIN UA: NEGATIVE MG/DL
PROTHROMBIN TIME: 11.9 SEC (ref 9.6–13)
RBC # BLD: 4.05 M/UL (ref 4–5.2)
RBC UA: 1 /HPF (ref 0–4)
SODIUM BLD-SCNC: 143 MMOL/L (ref 136–145)
SPECIFIC GRAVITY UA: 1.02
TOTAL PROTEIN: 7.2 G/DL (ref 6.4–8.2)
TROPONIN: <0.01 NG/ML
URINE REFLEX TO CULTURE: YES
URINE TYPE: ABNORMAL
UROBILINOGEN, URINE: 0.2 E.U./DL
WBC # BLD: 6.3 K/UL (ref 4–11)
WBC UA: 10 /HPF (ref 0–5)

## 2018-02-10 LAB — URINE CULTURE, ROUTINE: NORMAL

## 2018-02-11 PROCEDURE — 93010 ELECTROCARDIOGRAM REPORT: CPT | Performed by: INTERNAL MEDICINE

## 2018-02-19 ENCOUNTER — TELEPHONE (OUTPATIENT)
Dept: CARDIOLOGY CLINIC | Age: 83
End: 2018-02-19

## 2018-02-19 NOTE — TELEPHONE ENCOUNTER
Needs order for Echo scheduled on 2/20/18. Please place in Epic.   Pt has both Echo and a procedure scheduled with [facility] on 2/20/18 7 am    Thank you CSF

## 2018-02-22 LAB
EKG ATRIAL RATE: 52 BPM
EKG DIAGNOSIS: NORMAL
EKG P AXIS: 72 DEGREES
EKG P-R INTERVAL: 208 MS
EKG Q-T INTERVAL: 440 MS
EKG QRS DURATION: 104 MS
EKG QTC CALCULATION (BAZETT): 409 MS
EKG R AXIS: -46 DEGREES
EKG T AXIS: 71 DEGREES
EKG VENTRICULAR RATE: 52 BPM

## 2018-02-25 ENCOUNTER — TELEPHONE (OUTPATIENT)
Dept: CARDIOLOGY CLINIC | Age: 83
End: 2018-02-25

## 2018-02-25 NOTE — TELEPHONE ENCOUNTER
Spoke to pts son, Von Conner, regarding how pt is doing post TAVR. He states that she is doing very well with improved breathing and stamina with activity. Denies groin discomfort. Also spoke to pt who states that she is feeling well with no complaints. Aware of upcoming appointment with Dr. Jazmin Veloz. Instructed to call me in the interim with any questions/concerns.  Carl WRIGHT

## 2018-03-01 ENCOUNTER — OFFICE VISIT (OUTPATIENT)
Dept: CARDIOLOGY CLINIC | Age: 83
End: 2018-03-01

## 2018-03-01 VITALS
OXYGEN SATURATION: 98 % | WEIGHT: 128 LBS | HEART RATE: 60 BPM | SYSTOLIC BLOOD PRESSURE: 158 MMHG | BODY MASS INDEX: 21.97 KG/M2 | DIASTOLIC BLOOD PRESSURE: 72 MMHG

## 2018-03-01 DIAGNOSIS — I10 ESSENTIAL HYPERTENSION: ICD-10-CM

## 2018-03-01 DIAGNOSIS — E78.00 HYPERCHOLESTEREMIA: ICD-10-CM

## 2018-03-01 DIAGNOSIS — Z95.2 S/P TAVR (TRANSCATHETER AORTIC VALVE REPLACEMENT): ICD-10-CM

## 2018-03-01 DIAGNOSIS — I35.0 NONRHEUMATIC AORTIC VALVE STENOSIS: Primary | ICD-10-CM

## 2018-03-01 PROCEDURE — 1090F PRES/ABSN URINE INCON ASSESS: CPT | Performed by: INTERNAL MEDICINE

## 2018-03-01 PROCEDURE — 1123F ACP DISCUSS/DSCN MKR DOCD: CPT | Performed by: INTERNAL MEDICINE

## 2018-03-01 PROCEDURE — G8427 DOCREV CUR MEDS BY ELIG CLIN: HCPCS | Performed by: INTERNAL MEDICINE

## 2018-03-01 PROCEDURE — 4040F PNEUMOC VAC/ADMIN/RCVD: CPT | Performed by: INTERNAL MEDICINE

## 2018-03-01 PROCEDURE — 1036F TOBACCO NON-USER: CPT | Performed by: INTERNAL MEDICINE

## 2018-03-01 PROCEDURE — 99214 OFFICE O/P EST MOD 30 MIN: CPT | Performed by: INTERNAL MEDICINE

## 2018-03-01 PROCEDURE — G8484 FLU IMMUNIZE NO ADMIN: HCPCS | Performed by: INTERNAL MEDICINE

## 2018-03-01 PROCEDURE — 1111F DSCHRG MED/CURRENT MED MERGE: CPT | Performed by: INTERNAL MEDICINE

## 2018-03-01 PROCEDURE — 93000 ELECTROCARDIOGRAM COMPLETE: CPT | Performed by: INTERNAL MEDICINE

## 2018-03-01 PROCEDURE — G8420 CALC BMI NORM PARAMETERS: HCPCS | Performed by: INTERNAL MEDICINE

## 2018-03-01 NOTE — PROGRESS NOTES
[address]       H+P // CONSULT // OUTPATIENT VISIT // [facility] Johnson     Referring Doctor Lesli Stubbs MD   Encounter Type Followup     CHIEF COMPLAINT     VisitType [] Acute [x] Chronic     Symptom [x] None [] CP [] SOB [] Dizzy [] Palps [] Fatigue     Problems AS, PVD, HTN, Chol      HISTORY OF PRESENT ILLNESS     S/p TFTAVR and doing well at 2 weeks. No cp, sob. Feeling much better. Symptom Y N Frequency Duration Severity Modifying Assoc Sx Other   CP [] [x]         SOB [] [x]         Dizzy [] [x]         Syncope [] [x]         Palpitations [] [x]           COMPLIANCE     Category Meds Diet Salt Exercise Tobacco Alcohol Drugs   Compliant [x] [x] [] [] [x] [x] [x]   [x]Counseling given on all above above categories    HISTORY/ALLERGIES/ROS/MEDICATIONS     MedHx:  has a past medical history of Abnormal laboratory test result; Anemia; Anxiety; Aortic stenosis; Arthritis; Cataract; Dementia; Depression; Duodenitis; Episcleritis/scleritis; Fracture bone; HBP (high blood pressure); Hypothyroidism; Lupus (systemic lupus erythematosus) ([facility] [address] Neuropathy ([facility] [address] Osteoarthritis; PUD (peptic ulcer disease); Thyroid disease; Tremor; and Venous stasis. SurgHx:  has a past surgical history that includes Cataract removal (Bilateral); Total knee arthroplasty (Left); Carpal tunnel release (Right); Cardiac catheterization (12/18/2017); and Leg Debridement (Left, 10/12/2015). SocHx:   reports that she quit smoking about 50 years ago. She has a 20.00 pack-year smoking history. She has never used smokeless tobacco.   FamHx: family history includes Cancer in her father, maternal grandfather, mother, paternal grandmother, and sister; Heart Disease in her maternal grandmother; High Blood Pressure in her sister; High Cholesterol in her sister. Allergies: Codeine; Erythromycin; Vicodin [hydrocodone-acetaminophen];  Epinephrine; Calamine; and Nylon   ROS:  [x]Full ROS obtained and negative except as mentioned

## 2018-04-18 PROBLEM — Z95.2 S/P TAVR (TRANSCATHETER AORTIC VALVE REPLACEMENT): Status: ACTIVE | Noted: 2018-04-18

## 2018-04-19 ENCOUNTER — OFFICE VISIT (OUTPATIENT)
Dept: CARDIOLOGY CLINIC | Age: 83
End: 2018-04-19

## 2018-04-19 DIAGNOSIS — I35.0 NONRHEUMATIC AORTIC VALVE STENOSIS: Primary | ICD-10-CM

## 2018-04-19 DIAGNOSIS — I73.9 PVD (PERIPHERAL VASCULAR DISEASE) (HCC): ICD-10-CM

## 2018-04-19 DIAGNOSIS — I10 ESSENTIAL HYPERTENSION: ICD-10-CM

## 2018-04-19 DIAGNOSIS — E78.00 HYPERCHOLESTEREMIA: ICD-10-CM

## 2018-04-19 DIAGNOSIS — Z95.2 S/P TAVR (TRANSCATHETER AORTIC VALVE REPLACEMENT): ICD-10-CM

## 2018-05-03 ENCOUNTER — OFFICE VISIT (OUTPATIENT)
Dept: CARDIOLOGY CLINIC | Age: 83
End: 2018-05-03

## 2018-05-03 ENCOUNTER — HOSPITAL ENCOUNTER (OUTPATIENT)
Dept: NON INVASIVE DIAGNOSTICS | Age: 83
Discharge: HOME | End: 2018-05-03
Admitting: INTERNAL MEDICINE

## 2018-05-03 VITALS — DIASTOLIC BLOOD PRESSURE: 78 MMHG | HEART RATE: 58 BPM | HEIGHT: 64 IN | SYSTOLIC BLOOD PRESSURE: 140 MMHG

## 2018-05-03 DIAGNOSIS — I73.9 PVD (PERIPHERAL VASCULAR DISEASE) (HCC): ICD-10-CM

## 2018-05-03 DIAGNOSIS — I35.0 NONRHEUMATIC AORTIC VALVE STENOSIS: ICD-10-CM

## 2018-05-03 DIAGNOSIS — I35.0 NONRHEUMATIC AORTIC VALVE STENOSIS: Primary | ICD-10-CM

## 2018-05-03 DIAGNOSIS — Z95.2 S/P TAVR (TRANSCATHETER AORTIC VALVE REPLACEMENT): ICD-10-CM

## 2018-05-03 DIAGNOSIS — E78.00 HYPERCHOLESTEREMIA: ICD-10-CM

## 2018-05-03 DIAGNOSIS — I10 ESSENTIAL HYPERTENSION: ICD-10-CM

## 2018-05-03 LAB
LV EF: 58 %
LVEF MODALITY: NORMAL

## 2018-05-03 PROCEDURE — 99214 OFFICE O/P EST MOD 30 MIN: CPT | Performed by: INTERNAL MEDICINE

## 2018-05-03 PROCEDURE — 1036F TOBACCO NON-USER: CPT | Performed by: INTERNAL MEDICINE

## 2018-05-03 PROCEDURE — 4040F PNEUMOC VAC/ADMIN/RCVD: CPT | Performed by: INTERNAL MEDICINE

## 2018-05-03 PROCEDURE — G8420 CALC BMI NORM PARAMETERS: HCPCS | Performed by: INTERNAL MEDICINE

## 2018-05-03 PROCEDURE — G8427 DOCREV CUR MEDS BY ELIG CLIN: HCPCS | Performed by: INTERNAL MEDICINE

## 2018-05-03 PROCEDURE — 1090F PRES/ABSN URINE INCON ASSESS: CPT | Performed by: INTERNAL MEDICINE

## 2018-05-03 PROCEDURE — 1123F ACP DISCUSS/DSCN MKR DOCD: CPT | Performed by: INTERNAL MEDICINE

## 2018-05-07 ENCOUNTER — TELEPHONE (OUTPATIENT)
Dept: CARDIOLOGY CLINIC | Age: 83
End: 2018-05-07

## 2018-05-10 RX ORDER — MEMANTINE HYDROCHLORIDE 10 MG/1
10 TABLET ORAL 2 TIMES DAILY
Qty: 60 TABLET | Refills: 5 | Status: SHIPPED | OUTPATIENT
Start: 2018-05-10 | End: 2018-11-23 | Stop reason: SDUPTHER

## 2018-06-14 ENCOUNTER — OFFICE VISIT (OUTPATIENT)
Dept: CARDIOLOGY CLINIC | Age: 83
End: 2018-06-14

## 2018-06-14 ENCOUNTER — HOSPITAL ENCOUNTER (OUTPATIENT)
Dept: NON INVASIVE DIAGNOSTICS | Age: 83
Discharge: HOME | End: 2018-06-14
Attending: INTERNAL MEDICINE | Admitting: INTERNAL MEDICINE

## 2018-06-14 VITALS
HEART RATE: 62 BPM | BODY MASS INDEX: 20.49 KG/M2 | SYSTOLIC BLOOD PRESSURE: 150 MMHG | DIASTOLIC BLOOD PRESSURE: 70 MMHG | HEIGHT: 64 IN | WEIGHT: 120 LBS

## 2018-06-14 DIAGNOSIS — E78.00 HYPERCHOLESTEREMIA: ICD-10-CM

## 2018-06-14 DIAGNOSIS — I35.0 NONRHEUMATIC AORTIC (VALVE) STENOSIS: Primary | ICD-10-CM

## 2018-06-14 DIAGNOSIS — I73.9 PVD (PERIPHERAL VASCULAR DISEASE) (HCC): ICD-10-CM

## 2018-06-14 DIAGNOSIS — I35.0 NONRHEUMATIC AORTIC VALVE STENOSIS: ICD-10-CM

## 2018-06-14 DIAGNOSIS — I10 ESSENTIAL HYPERTENSION: ICD-10-CM

## 2018-06-14 PROCEDURE — G8427 DOCREV CUR MEDS BY ELIG CLIN: HCPCS | Performed by: INTERNAL MEDICINE

## 2018-06-14 PROCEDURE — 1036F TOBACCO NON-USER: CPT | Performed by: INTERNAL MEDICINE

## 2018-06-14 PROCEDURE — G8420 CALC BMI NORM PARAMETERS: HCPCS | Performed by: INTERNAL MEDICINE

## 2018-06-14 PROCEDURE — 4040F PNEUMOC VAC/ADMIN/RCVD: CPT | Performed by: INTERNAL MEDICINE

## 2018-06-14 PROCEDURE — 99214 OFFICE O/P EST MOD 30 MIN: CPT | Performed by: INTERNAL MEDICINE

## 2018-06-14 PROCEDURE — 1123F ACP DISCUSS/DSCN MKR DOCD: CPT | Performed by: INTERNAL MEDICINE

## 2018-06-14 PROCEDURE — 1090F PRES/ABSN URINE INCON ASSESS: CPT | Performed by: INTERNAL MEDICINE

## 2018-07-06 RX ORDER — FUROSEMIDE 20 MG/1
20 TABLET ORAL DAILY
OUTPATIENT
Start: 2018-07-06

## 2018-07-06 RX ORDER — LOSARTAN POTASSIUM 100 MG/1
50 TABLET ORAL 2 TIMES DAILY
Qty: 30 TABLET | OUTPATIENT
Start: 2018-07-06

## 2018-07-25 RX ORDER — FUROSEMIDE 20 MG/1
TABLET ORAL
Qty: 30 TABLET | Refills: 0 | Status: SHIPPED | OUTPATIENT
Start: 2018-07-25 | End: 2018-08-23 | Stop reason: SDUPTHER

## 2018-08-07 RX ORDER — LOSARTAN POTASSIUM 50 MG/1
50 TABLET ORAL 2 TIMES DAILY
Qty: 180 TABLET | Refills: 3 | Status: ON HOLD | OUTPATIENT
Start: 2018-08-07 | End: 2018-12-14

## 2018-08-24 RX ORDER — FUROSEMIDE 20 MG/1
TABLET ORAL
Qty: 30 TABLET | Refills: 2 | Status: SHIPPED | OUTPATIENT
Start: 2018-08-24 | End: 2018-11-23 | Stop reason: SDUPTHER

## 2018-10-23 ENCOUNTER — TELEPHONE (OUTPATIENT)
Dept: CARDIOLOGY CLINIC | Age: 83
End: 2018-10-23

## 2018-11-23 RX ORDER — FUROSEMIDE 20 MG/1
TABLET ORAL
Qty: 30 TABLET | Refills: 1 | Status: SHIPPED | OUTPATIENT
Start: 2018-11-23 | End: 2019-01-22 | Stop reason: SDUPTHER

## 2018-11-23 RX ORDER — MEMANTINE HYDROCHLORIDE 10 MG/1
TABLET ORAL
Qty: 60 TABLET | Refills: 4 | Status: SHIPPED | OUTPATIENT
Start: 2018-11-23 | End: 2019-04-22 | Stop reason: SDUPTHER

## 2018-12-02 ENCOUNTER — APPOINTMENT (OUTPATIENT)
Dept: GENERAL RADIOLOGY | Age: 83
End: 2018-12-02
Payer: MEDICARE

## 2018-12-02 ENCOUNTER — APPOINTMENT (OUTPATIENT)
Dept: CT IMAGING | Age: 83
End: 2018-12-02
Payer: MEDICARE

## 2018-12-02 ENCOUNTER — HOSPITAL ENCOUNTER (EMERGENCY)
Age: 83
Discharge: HOME | End: 2018-12-02
Payer: MEDICARE

## 2018-12-02 VITALS
DIASTOLIC BLOOD PRESSURE: 64 MMHG | BODY MASS INDEX: 24.72 KG/M2 | RESPIRATION RATE: 14 BRPM | OXYGEN SATURATION: 100 % | WEIGHT: 139.55 LBS | TEMPERATURE: 98.3 F | SYSTOLIC BLOOD PRESSURE: 207 MMHG | HEART RATE: 61 BPM

## 2018-12-02 DIAGNOSIS — S00.83XA FACIAL BRUISING, INITIAL ENCOUNTER: Primary | ICD-10-CM

## 2018-12-02 DIAGNOSIS — S00.411A EAR ABRASION, RIGHT, INITIAL ENCOUNTER: ICD-10-CM

## 2018-12-02 DIAGNOSIS — Z91.81 HISTORY OF FALL: ICD-10-CM

## 2018-12-02 LAB
ANION GAP SERPL CALCULATED.3IONS-SCNC: 12 MMOL/L (ref 3–16)
BASOPHILS ABSOLUTE: 0 K/UL (ref 0–0.2)
BASOPHILS RELATIVE PERCENT: 0.4 %
BILIRUBIN URINE: NEGATIVE
BLOOD, URINE: NEGATIVE
BUN BLDV-MCNC: 30 MG/DL (ref 7–20)
CALCIUM SERPL-MCNC: 9.1 MG/DL (ref 8.3–10.6)
CHLORIDE BLD-SCNC: 107 MMOL/L (ref 99–110)
CLARITY: CLEAR
CO2: 24 MMOL/L (ref 21–32)
COLOR: YELLOW
CREAT SERPL-MCNC: 0.9 MG/DL (ref 0.6–1.2)
EOSINOPHILS ABSOLUTE: 0.3 K/UL (ref 0–0.6)
EOSINOPHILS RELATIVE PERCENT: 4 %
GFR AFRICAN AMERICAN: >60
GFR NON-AFRICAN AMERICAN: 59
GLUCOSE BLD-MCNC: 89 MG/DL (ref 70–99)
GLUCOSE URINE: NEGATIVE MG/DL
HCT VFR BLD CALC: 34.5 % (ref 36–48)
HEMOGLOBIN: 11.2 G/DL (ref 12–16)
KETONES, URINE: ABNORMAL MG/DL
LEUKOCYTE ESTERASE, URINE: NEGATIVE
LYMPHOCYTES ABSOLUTE: 0.9 K/UL (ref 1–5.1)
LYMPHOCYTES RELATIVE PERCENT: 13.5 %
MCH RBC QN AUTO: 27.3 PG (ref 26–34)
MCHC RBC AUTO-ENTMCNC: 32.5 G/DL (ref 31–36)
MCV RBC AUTO: 84.1 FL (ref 80–100)
MICROSCOPIC EXAMINATION: ABNORMAL
MONOCYTES ABSOLUTE: 0.7 K/UL (ref 0–1.3)
MONOCYTES RELATIVE PERCENT: 9.8 %
NEUTROPHILS ABSOLUTE: 5 K/UL (ref 1.7–7.7)
NEUTROPHILS RELATIVE PERCENT: 72.3 %
NITRITE, URINE: NEGATIVE
PDW BLD-RTO: 14.3 % (ref 12.4–15.4)
PH UA: 7
PLATELET # BLD: 144 K/UL (ref 135–450)
PMV BLD AUTO: 7.7 FL (ref 5–10.5)
POTASSIUM SERPL-SCNC: 4.4 MMOL/L (ref 3.5–5.1)
PROTEIN UA: NEGATIVE MG/DL
RBC # BLD: 4.1 M/UL (ref 4–5.2)
SODIUM BLD-SCNC: 143 MMOL/L (ref 136–145)
SPECIFIC GRAVITY UA: 1.01
URINE REFLEX TO CULTURE: ABNORMAL
URINE TYPE: ABNORMAL
UROBILINOGEN, URINE: 0.2 E.U./DL
WBC # BLD: 6.9 K/UL (ref 4–11)

## 2018-12-02 PROCEDURE — 70486 CT MAXILLOFACIAL W/O DYE: CPT

## 2018-12-02 PROCEDURE — 73522 X-RAY EXAM HIPS BI 3-4 VIEWS: CPT

## 2018-12-02 PROCEDURE — 81003 URINALYSIS AUTO W/O SCOPE: CPT

## 2018-12-02 PROCEDURE — 70450 CT HEAD/BRAIN W/O DYE: CPT

## 2018-12-02 PROCEDURE — 93005 ELECTROCARDIOGRAM TRACING: CPT | Performed by: PHYSICIAN ASSISTANT

## 2018-12-02 PROCEDURE — 72125 CT NECK SPINE W/O DYE: CPT

## 2018-12-02 PROCEDURE — 73590 X-RAY EXAM OF LOWER LEG: CPT

## 2018-12-02 PROCEDURE — 93010 ELECTROCARDIOGRAM REPORT: CPT | Performed by: INTERNAL MEDICINE

## 2018-12-02 PROCEDURE — 71046 X-RAY EXAM CHEST 2 VIEWS: CPT

## 2018-12-02 PROCEDURE — 99285 EMERGENCY DEPT VISIT HI MDM: CPT

## 2018-12-02 PROCEDURE — 80048 BASIC METABOLIC PNL TOTAL CA: CPT

## 2018-12-02 PROCEDURE — 36415 COLL VENOUS BLD VENIPUNCTURE: CPT

## 2018-12-02 PROCEDURE — 85025 COMPLETE CBC W/AUTO DIFF WBC: CPT

## 2018-12-02 NOTE — DISCHARGE INSTR - COC
stenosis, severe I35.0    S/P TAVR (transcatheter aortic valve replacement) Z95.2       Isolation/Infection:   Isolation          No Isolation        Patient Infection Status     Infection Encounter Level? Added Added By Resolved Resolved By Review Date Onset Date    MRSA No 07/02/18 Adriana Bianchi RN        + urine culture 6/29/2018          Nurse Assessment:  Last Vital Signs: BP (!) 188/75   Pulse 60   Temp 98.3 °F (36.8 °C) (Oral)   Resp 18   Wt 139 lb 8.8 oz (63.3 kg)   SpO2 100%   BMI 24.72 kg/m²     Last documented pain score (0-10 scale):    Last Weight:   Wt Readings from Last 1 Encounters:   12/02/18 139 lb 8.8 oz (63.3 kg)     Mental Status:  disoriented    IV Access:  - None    Nursing Mobility/ADLs:  Walking   Assisted  Transfer  Assisted  Bathing  Assisted  Dressing  Assisted  Toileting  Assisted  Feeding  Independent  Med Admin  Assisted  Med Delivery   whole    Wound Care Documentation and Therapy:        Elimination:  Continence:   · Bowel: No  · Bladder: No  Urinary Catheter: None   Colostomy/Ileostomy/Ileal Conduit: No       Date of Last BM: unknown  No intake or output data in the 24 hours ending 12/02/18 1321  No intake/output data recorded. Safety Concerns: At Risk for Falls    Impairments/Disabilities:      None    Nutrition Therapy:  Current Nutrition Therapy:   - Oral Diet:  General    Routes of Feeding: Oral  Liquids: No Restrictions  Daily Fluid Restriction: no  Last Modified Barium Swallow with Video (Video Swallowing Test): not done    Treatments at the Time of Hospital Discharge:   Respiratory Treatments: *none**  Oxygen Therapy:  is not on home oxygen therapy.   Ventilator:    - No ventilator support    Rehab Therapies: Occupational Therapy,Physical Therapy and RN  Weight Bearing Status/Restrictions: No weight bearing restirctions  Other Medical Equipment (for information only, NOT a DME order):  walker  Other Treatments: NONE    Patient's personal belongings (please

## 2018-12-02 NOTE — ED PROVIDER NOTES
EKG: Sinus bradycardia, rate 58, normal QRS, normal QT, no ST depression or elevation, normal T waves, Q waves in the anterior leads    All EKG's are interpreted by the Emergency Department Physician who either signs or Co-signs this chart in the absence of a cardiologist.        Hector Brown DO  12/02/18 1402
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
## 2018-12-02 NOTE — ED PROVIDER NOTES
touch intact bilaterally, patellar reflexes 2+ and equal bilaterally  Psych: Pleasant affect, no hallucinations    EKG   Please see the ED Physician note for EKG interpretation. RADIOLOGY  (interpreted by the radiologist)  CT Head WO Contrast   Final Result   Advanced cervical spine degenerative changes without acute findings. No acute intracranial hemorrhage. CT Cervical Spine WO Contrast   Final Result   Advanced cervical spine degenerative changes without acute findings. No acute intracranial hemorrhage. XR HIP 3-4 VW W PELVIS BILATERAL   Final Result   No evidence of fracture         XR TIBIA FIBULA LEFT (2 VIEWS)   Final Result   No evidence of fracture         CT Facial Bones WO Contrast   Final Result   No acute traumatic injury of the facial bones. XR CHEST STANDARD (2 VW)   Final Result   No acute process demonstrated           ED COURSE & MEDICAL DECISION MAKING    Pertinent Labs & Imaging studies reviewed and interpreted. (See chart for details)  See chart for details of medications given during the ED stay. Vitals:    12/02/18 1012 12/02/18 1122   BP: (!) 188/75    Pulse: 60 60   Resp: 16 18   Temp: 98.3 °F (36.8 °C)    TempSrc: Oral    SpO2: 100%    Weight: 139 lb 8.8 oz (63.3 kg)        Differential Diagnosis: Cardiac Arrhythmia, Stroke, Sepsis/Infection, Anemia, Fracture, Dislocation, other. Patient is afebrile and nontoxic in appearance. Neurological exam unremarkable. Labs reveal no leukocytosis. Hemoglobin 11.2, at baseline. BUN 30, slightly above baseline. Metabolic panel otherwise unremarkable. Urinalysis unremarkable. CT and plain film findings as above. EKG nonischemic as interpreted by ED physician. 12:30 PM: Patient's friend is at the bedside, states that she is concerned about the patient being home alone, as she states that the patient's son works all day.  was consulted.     Consultation with  at 1 PM: I spoke with Chavez Riley, and she will evaluate the patient in the ED. She said that she will arrange for home health care nurse to come to the patient's house.  evaluated the patient at the bedside and discussed the plan with her. The patient was instructed to follow up as an outpatient in 2 days. The patient was instructed to return to the ED immediately for any new or worsening symptoms. The patient verbalized understanding. I have evaluated this patient. My supervising physician was available for consultation. FINAL IMPRESSION    1. Facial bruising, initial encounter    2. Ear abrasion, right, initial encounter    3.  History of fall        PLAN  Discharge with close outpatient follow-up (see EMR)       (Please note that this note was completed with a voice recognition program.  Every attempt was made to edit the dictations, but inevitably there remain words that are mis-transcribed.)          Deborah Carr, [address]  12/02/18 0813

## 2018-12-05 ENCOUNTER — TELEPHONE (OUTPATIENT)
Dept: INTERNAL MEDICINE CLINIC | Age: 83
End: 2018-12-05

## 2018-12-06 ENCOUNTER — TELEPHONE (OUTPATIENT)
Dept: INTERNAL MEDICINE CLINIC | Age: 83
End: 2018-12-06

## 2018-12-10 ENCOUNTER — TELEPHONE (OUTPATIENT)
Dept: INTERNAL MEDICINE CLINIC | Age: 83
End: 2018-12-10

## 2018-12-11 ENCOUNTER — TELEPHONE (OUTPATIENT)
Dept: INTERNAL MEDICINE CLINIC | Age: 83
End: 2018-12-11

## 2018-12-11 DIAGNOSIS — S80.12XA LEG HEMATOMA, LEFT, INITIAL ENCOUNTER: Primary | ICD-10-CM

## 2018-12-13 ENCOUNTER — APPOINTMENT (OUTPATIENT)
Dept: GENERAL RADIOLOGY | Age: 83
End: 2018-12-13
Payer: MEDICARE

## 2018-12-13 ENCOUNTER — HOSPITAL ENCOUNTER (OUTPATIENT)
Age: 83
Setting detail: OBSERVATION
Discharge: HOME | End: 2018-12-17
Attending: EMERGENCY MEDICINE | Admitting: INTERNAL MEDICINE
Payer: MEDICARE

## 2018-12-13 ENCOUNTER — APPOINTMENT (OUTPATIENT)
Dept: CT IMAGING | Age: 83
End: 2018-12-13
Payer: MEDICARE

## 2018-12-13 DIAGNOSIS — R41.82 ALTERED MENTAL STATUS, UNSPECIFIED ALTERED MENTAL STATUS TYPE: Primary | ICD-10-CM

## 2018-12-13 PROBLEM — G93.41 ACUTE METABOLIC ENCEPHALOPATHY: Status: ACTIVE | Noted: 2018-12-13

## 2018-12-13 PROBLEM — G93.41 ACUTE METABOLIC ENCEPHALOPATHY: Status: RESOLVED | Noted: 2018-12-13 | Resolved: 2018-12-13

## 2018-12-13 PROBLEM — R40.4 TRANSIENT ALTERATION OF AWARENESS: Status: ACTIVE | Noted: 2018-12-13
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
## 2018-12-13 LAB
A/G RATIO: 1 (ref 1.1–2.2)
ALBUMIN SERPL-MCNC: 3.6 G/DL (ref 3.4–5)
ALP BLD-CCNC: 71 U/L (ref 40–129)
ALT SERPL-CCNC: 12 U/L (ref 10–40)
ANION GAP SERPL CALCULATED.3IONS-SCNC: 11 MMOL/L (ref 3–16)
AST SERPL-CCNC: 19 U/L (ref 15–37)
BASOPHILS ABSOLUTE: 0 K/UL (ref 0–0.2)
BASOPHILS RELATIVE PERCENT: 0.5 %
BILIRUB SERPL-MCNC: 0.3 MG/DL (ref 0–1)
BILIRUBIN URINE: NEGATIVE
BLOOD, URINE: NEGATIVE
BUN BLDV-MCNC: 25 MG/DL (ref 7–20)
CALCIUM SERPL-MCNC: 9.5 MG/DL (ref 8.3–10.6)
CHLORIDE BLD-SCNC: 106 MMOL/L (ref 99–110)
CLARITY: ABNORMAL
CO2: 27 MMOL/L (ref 21–32)
COLOR: YELLOW
CREAT SERPL-MCNC: 0.9 MG/DL (ref 0.6–1.2)
EOSINOPHILS ABSOLUTE: 0.3 K/UL (ref 0–0.6)
EOSINOPHILS RELATIVE PERCENT: 7 %
EPITHELIAL CELLS, UA: 1 /HPF (ref 0–5)
GFR AFRICAN AMERICAN: >60
GFR NON-AFRICAN AMERICAN: 59
GLOBULIN: 3.6 G/DL
GLUCOSE BLD-MCNC: 86 MG/DL (ref 70–99)
GLUCOSE BLD-MCNC: 97 MG/DL (ref 70–99)
GLUCOSE URINE: NEGATIVE MG/DL
HCT VFR BLD CALC: 33.5 % (ref 36–48)
HEMOGLOBIN: 11 G/DL (ref 12–16)
HYALINE CASTS: 0 /LPF (ref 0–8)
KETONES, URINE: NEGATIVE MG/DL
LEUKOCYTE ESTERASE, URINE: NEGATIVE
LYMPHOCYTES ABSOLUTE: 0.9 K/UL (ref 1–5.1)
LYMPHOCYTES RELATIVE PERCENT: 20 %
MCH RBC QN AUTO: 27.3 PG (ref 26–34)
MCHC RBC AUTO-ENTMCNC: 32.8 G/DL (ref 31–36)
MCV RBC AUTO: 83.2 FL (ref 80–100)
MICROSCOPIC EXAMINATION: YES
MONOCYTES ABSOLUTE: 0.4 K/UL (ref 0–1.3)
MONOCYTES RELATIVE PERCENT: 10.1 %
NEUTROPHILS ABSOLUTE: 2.7 K/UL (ref 1.7–7.7)
NEUTROPHILS RELATIVE PERCENT: 62.4 %
NITRITE, URINE: NEGATIVE
PDW BLD-RTO: 14 % (ref 12.4–15.4)
PERFORMED ON: NORMAL
PH UA: 8.5
PLATELET # BLD: 163 K/UL (ref 135–450)
PMV BLD AUTO: 7.3 FL (ref 5–10.5)
POTASSIUM SERPL-SCNC: 4.1 MMOL/L (ref 3.5–5.1)
PROTEIN UA: ABNORMAL MG/DL
RBC # BLD: 4.02 M/UL (ref 4–5.2)
RBC UA: 1 /HPF (ref 0–4)
SODIUM BLD-SCNC: 144 MMOL/L (ref 136–145)
SPECIFIC GRAVITY UA: 1.02
TOTAL PROTEIN: 7.2 G/DL (ref 6.4–8.2)
URINE REFLEX TO CULTURE: ABNORMAL
URINE TYPE: ABNORMAL
UROBILINOGEN, URINE: 0.2 E.U./DL
WBC # BLD: 4.3 K/UL (ref 4–11)
WBC UA: 1 /HPF (ref 0–5)

## 2018-12-13 PROCEDURE — 70450 CT HEAD/BRAIN W/O DYE: CPT

## 2018-12-13 PROCEDURE — G0378 HOSPITAL OBSERVATION PER HR: HCPCS

## 2018-12-13 PROCEDURE — 99285 EMERGENCY DEPT VISIT HI MDM: CPT

## 2018-12-13 PROCEDURE — 93005 ELECTROCARDIOGRAM TRACING: CPT | Performed by: EMERGENCY MEDICINE

## 2018-12-13 PROCEDURE — 6370000000 HC RX 637 (ALT 250 FOR IP): Performed by: INTERNAL MEDICINE

## 2018-12-13 PROCEDURE — 71045 X-RAY EXAM CHEST 1 VIEW: CPT

## 2018-12-13 PROCEDURE — 85025 COMPLETE CBC W/AUTO DIFF WBC: CPT

## 2018-12-13 PROCEDURE — 93010 ELECTROCARDIOGRAM REPORT: CPT | Performed by: INTERNAL MEDICINE

## 2018-12-13 PROCEDURE — 80053 COMPREHEN METABOLIC PANEL: CPT

## 2018-12-13 PROCEDURE — 73590 X-RAY EXAM OF LOWER LEG: CPT

## 2018-12-13 PROCEDURE — 81001 URINALYSIS AUTO W/SCOPE: CPT

## 2018-12-13 RX ORDER — MEMANTINE HYDROCHLORIDE 10 MG/1
1 TABLET ORAL 2 TIMES DAILY
Status: CANCELLED | OUTPATIENT
Start: 2018-12-13

## 2018-12-13 RX ORDER — CITALOPRAM 20 MG/1
20 TABLET ORAL DAILY
Status: CANCELLED | OUTPATIENT
Start: 2018-12-13

## 2018-12-13 RX ORDER — FUROSEMIDE 20 MG/1
1 TABLET ORAL DAILY
Status: CANCELLED | OUTPATIENT
Start: 2018-12-13

## 2018-12-13 RX ORDER — LOSARTAN POTASSIUM 25 MG/1
50 TABLET ORAL 2 TIMES DAILY
Status: CANCELLED | OUTPATIENT
Start: 2018-12-13

## 2018-12-13 RX ORDER — LEVOTHYROXINE SODIUM 0.03 MG/1
25 TABLET ORAL DAILY
Status: CANCELLED | OUTPATIENT
Start: 2018-12-13

## 2018-12-13 RX ORDER — LOSARTAN POTASSIUM 50 MG/1
50 TABLET ORAL 2 TIMES DAILY
Status: DISCONTINUED | OUTPATIENT
Start: 2018-12-14 | End: 2018-12-17 | Stop reason: HOSPADM

## 2018-12-13 RX ORDER — PRIMIDONE 50 MG/1
50 TABLET ORAL 2 TIMES DAILY
Status: DISCONTINUED | OUTPATIENT
Start: 2018-12-13 | End: 2018-12-17 | Stop reason: HOSPADM

## 2018-12-13 RX ORDER — ACETAMINOPHEN 325 MG/1
650 TABLET ORAL EVERY 4 HOURS PRN
Status: DISCONTINUED | OUTPATIENT
Start: 2018-12-13 | End: 2018-12-17 | Stop reason: HOSPADM

## 2018-12-13 RX ORDER — DOCUSATE SODIUM 100 MG/1
100 CAPSULE, LIQUID FILLED ORAL 2 TIMES DAILY PRN
Status: CANCELLED | OUTPATIENT
Start: 2018-12-13

## 2018-12-13 RX ORDER — ASPIRIN 81 MG/1
81 TABLET, CHEWABLE ORAL DAILY
Status: DISCONTINUED | OUTPATIENT
Start: 2018-12-14 | End: 2018-12-17 | Stop reason: HOSPADM

## 2018-12-13 RX ORDER — PRIMIDONE 50 MG/1
50 TABLET ORAL 2 TIMES DAILY
Status: CANCELLED | OUTPATIENT
Start: 2018-12-13

## 2018-12-13 RX ORDER — SODIUM CHLORIDE 0.9 % (FLUSH) 0.9 %
10 SYRINGE (ML) INJECTION PRN
Status: CANCELLED | OUTPATIENT
Start: 2018-12-13

## 2018-12-13 RX ORDER — ASCORBIC ACID 500 MG
500 TABLET ORAL DAILY
Status: DISCONTINUED | OUTPATIENT
Start: 2018-12-14 | End: 2018-12-17 | Stop reason: HOSPADM

## 2018-12-13 RX ORDER — MEMANTINE HYDROCHLORIDE 10 MG/1
10 TABLET ORAL 2 TIMES DAILY
Status: DISCONTINUED | OUTPATIENT
Start: 2018-12-13 | End: 2018-12-17 | Stop reason: HOSPADM

## 2018-12-13 RX ORDER — BISACODYL 10 MG
10 SUPPOSITORY, RECTAL RECTAL DAILY PRN
Status: CANCELLED | OUTPATIENT
Start: 2018-12-13

## 2018-12-13 RX ORDER — ONDANSETRON 2 MG/ML
4 INJECTION INTRAMUSCULAR; INTRAVENOUS EVERY 4 HOURS PRN
Status: CANCELLED | OUTPATIENT
Start: 2018-12-13

## 2018-12-13 RX ORDER — GABAPENTIN 100 MG/1
100 CAPSULE ORAL 3 TIMES DAILY
Status: DISCONTINUED | OUTPATIENT
Start: 2018-12-13 | End: 2018-12-17 | Stop reason: HOSPADM

## 2018-12-13 RX ORDER — ASCORBIC ACID 500 MG
500 TABLET ORAL DAILY
COMMUNITY

## 2018-12-13 RX ORDER — CITALOPRAM 20 MG/1
20 TABLET ORAL DAILY
Status: DISCONTINUED | OUTPATIENT
Start: 2018-12-14 | End: 2018-12-17 | Stop reason: HOSPADM

## 2018-12-13 RX ORDER — LEVOTHYROXINE SODIUM 0.03 MG/1
25 TABLET ORAL DAILY
Status: DISCONTINUED | OUTPATIENT
Start: 2018-12-14 | End: 2018-12-17 | Stop reason: HOSPADM

## 2018-12-13 RX ORDER — FAMOTIDINE 20 MG/1
20 TABLET, FILM COATED ORAL 2 TIMES DAILY
Status: CANCELLED | OUTPATIENT
Start: 2018-12-13

## 2018-12-13 RX ORDER — SODIUM CHLORIDE 0.9 % (FLUSH) 0.9 %
10 SYRINGE (ML) INJECTION EVERY 12 HOURS SCHEDULED
Status: CANCELLED | OUTPATIENT
Start: 2018-12-13

## 2018-12-13 RX ADMIN — MEMANTINE 10 MG: 10 TABLET ORAL at 22:00

## 2018-12-13 RX ADMIN — APIXABAN 2.5 MG: 5 TABLET, FILM COATED ORAL at 21:19

## 2018-12-13 RX ADMIN — GABAPENTIN 100 MG: 100 CAPSULE ORAL at 21:19

## 2018-12-13 RX ADMIN — PRIMIDONE 50 MG: 50 TABLET ORAL at 21:19

## 2018-12-13 RX ADMIN — ACETAMINOPHEN 650 MG: 325 TABLET, FILM COATED ORAL at 18:22

## 2018-12-13 ASSESSMENT — ENCOUNTER SYMPTOMS
VOMITING: 0
ABDOMINAL PAIN: 0
SHORTNESS OF BREATH: 0
COUGH: 0
COLOR CHANGE: 1
CHEST TIGHTNESS: 0
GASTROINTESTINAL NEGATIVE: 1
ABDOMINAL DISTENTION: 0
BLOOD IN STOOL: 0
RESPIRATORY NEGATIVE: 1
BACK PAIN: 0
DIARRHEA: 0
CONSTIPATION: 0
NAUSEA: 0

## 2018-12-13 ASSESSMENT — PAIN SCALES - PAIN ASSESSMENT IN ADVANCED DEMENTIA (PAINAD)
BREATHING: 0
CONSOLABILITY: 1
NEGVOCALIZATION: 1
BREATHING: 0
TOTALSCORE: 3
BODYLANGUAGE: 0
CONSOLABILITY: 0
FACIALEXPRESSION: 0
NEGVOCALIZATION: 1
FACIALEXPRESSION: 0
TOTALSCORE: 1
BODYLANGUAGE: 1

## 2018-12-13 ASSESSMENT — PAIN SCALES - GENERAL: PAINLEVEL_OUTOF10: 1
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
## 2018-12-13 NOTE — ED NOTES
Second attempt made to reach pedro Jay. Phone continues to ring busy.       Emmie Arndt RN  12/13/18 7549

## 2018-12-13 NOTE — ED NOTES
Son Venkatesh Ruano tried at [phone]. Call went to voicemail and message was left with return number.       Patel Billingsley RN  12/13/18 2138

## 2018-12-14 PROBLEM — G30.1 LATE ONSET ALZHEIMER'S DISEASE WITHOUT BEHAVIORAL DISTURBANCE (HCC): Chronic | Status: ACTIVE | Noted: 2018-12-14

## 2018-12-14 PROBLEM — F02.80 LATE ONSET ALZHEIMER'S DISEASE WITHOUT BEHAVIORAL DISTURBANCE (HCC): Chronic | Status: ACTIVE | Noted: 2018-12-14

## 2018-12-14 LAB
A/G RATIO: 1.1 (ref 1.1–2.2)
ALBUMIN SERPL-MCNC: 3.3 G/DL (ref 3.4–5)
ALP BLD-CCNC: 65 U/L (ref 40–129)
ALT SERPL-CCNC: 11 U/L (ref 10–40)
ANION GAP SERPL CALCULATED.3IONS-SCNC: 9 MMOL/L (ref 3–16)
AST SERPL-CCNC: 16 U/L (ref 15–37)
BASOPHILS ABSOLUTE: 0 K/UL (ref 0–0.2)
BASOPHILS RELATIVE PERCENT: 0.7 %
BILIRUB SERPL-MCNC: <0.2 MG/DL (ref 0–1)
BUN BLDV-MCNC: 22 MG/DL (ref 7–20)
CALCIUM SERPL-MCNC: 8.7 MG/DL (ref 8.3–10.6)
CHLORIDE BLD-SCNC: 109 MMOL/L (ref 99–110)
CO2: 26 MMOL/L (ref 21–32)
CREAT SERPL-MCNC: 0.8 MG/DL (ref 0.6–1.2)
EKG ATRIAL RATE: 53 BPM
EKG DIAGNOSIS: NORMAL
EKG P AXIS: 79 DEGREES
EKG P-R INTERVAL: 224 MS
EKG Q-T INTERVAL: 448 MS
EKG QRS DURATION: 114 MS
EKG QTC CALCULATION (BAZETT): 420 MS
EKG R AXIS: 37 DEGREES
EKG T AXIS: 78 DEGREES
EKG VENTRICULAR RATE: 53 BPM
EOSINOPHILS ABSOLUTE: 0.3 K/UL (ref 0–0.6)
EOSINOPHILS RELATIVE PERCENT: 8.3 %
GFR AFRICAN AMERICAN: >60
GFR NON-AFRICAN AMERICAN: >60
GLOBULIN: 3 G/DL
GLUCOSE BLD-MCNC: 85 MG/DL (ref 70–99)
HCT VFR BLD CALC: 31.8 % (ref 36–48)
HEMOGLOBIN: 10.5 G/DL (ref 12–16)
LYMPHOCYTES ABSOLUTE: 0.9 K/UL (ref 1–5.1)
LYMPHOCYTES RELATIVE PERCENT: 20.8 %
MCH RBC QN AUTO: 27.8 PG (ref 26–34)
MCHC RBC AUTO-ENTMCNC: 32.9 G/DL (ref 31–36)
MCV RBC AUTO: 84.5 FL (ref 80–100)
MONOCYTES ABSOLUTE: 0.4 K/UL (ref 0–1.3)
MONOCYTES RELATIVE PERCENT: 10.4 %
NEUTROPHILS ABSOLUTE: 2.5 K/UL (ref 1.7–7.7)
NEUTROPHILS RELATIVE PERCENT: 59.8 %
PDW BLD-RTO: 13.7 % (ref 12.4–15.4)
PLATELET # BLD: 153 K/UL (ref 135–450)
PMV BLD AUTO: 7.4 FL (ref 5–10.5)
POTASSIUM SERPL-SCNC: 4.4 MMOL/L (ref 3.5–5.1)
RBC # BLD: 3.76 M/UL (ref 4–5.2)
SODIUM BLD-SCNC: 144 MMOL/L (ref 136–145)
TOTAL PROTEIN: 6.3 G/DL (ref 6.4–8.2)
WBC # BLD: 4.2 K/UL (ref 4–11)

## 2018-12-14 PROCEDURE — G8979 MOBILITY GOAL STATUS: HCPCS

## 2018-12-14 PROCEDURE — 97161 PT EVAL LOW COMPLEX 20 MIN: CPT

## 2018-12-14 PROCEDURE — 99222 1ST HOSP IP/OBS MODERATE 55: CPT | Performed by: INTERNAL MEDICINE

## 2018-12-14 PROCEDURE — 97530 THERAPEUTIC ACTIVITIES: CPT

## 2018-12-14 PROCEDURE — 6370000000 HC RX 637 (ALT 250 FOR IP): Performed by: INTERNAL MEDICINE

## 2018-12-14 PROCEDURE — G0378 HOSPITAL OBSERVATION PER HR: HCPCS

## 2018-12-14 PROCEDURE — G8987 SELF CARE CURRENT STATUS: HCPCS

## 2018-12-14 PROCEDURE — 97116 GAIT TRAINING THERAPY: CPT

## 2018-12-14 PROCEDURE — G8988 SELF CARE GOAL STATUS: HCPCS

## 2018-12-14 PROCEDURE — G8978 MOBILITY CURRENT STATUS: HCPCS

## 2018-12-14 PROCEDURE — 97535 SELF CARE MNGMENT TRAINING: CPT

## 2018-12-14 PROCEDURE — 85025 COMPLETE CBC W/AUTO DIFF WBC: CPT

## 2018-12-14 PROCEDURE — 97165 OT EVAL LOW COMPLEX 30 MIN: CPT

## 2018-12-14 PROCEDURE — 80053 COMPREHEN METABOLIC PANEL: CPT

## 2018-12-14 PROCEDURE — 36415 COLL VENOUS BLD VENIPUNCTURE: CPT

## 2018-12-14 RX ORDER — LOSARTAN POTASSIUM 100 MG/1
50 TABLET ORAL 2 TIMES DAILY
COMMUNITY
End: 2019-04-17

## 2018-12-14 RX ADMIN — LOSARTAN POTASSIUM 50 MG: 50 TABLET ORAL at 10:09

## 2018-12-14 RX ADMIN — LOSARTAN POTASSIUM 50 MG: 50 TABLET ORAL at 18:10

## 2018-12-14 RX ADMIN — PRIMIDONE 50 MG: 50 TABLET ORAL at 10:07

## 2018-12-14 RX ADMIN — MEMANTINE 10 MG: 10 TABLET ORAL at 10:07

## 2018-12-14 RX ADMIN — GABAPENTIN 100 MG: 100 CAPSULE ORAL at 19:48

## 2018-12-14 RX ADMIN — LEVOTHYROXINE SODIUM 25 MCG: 25 TABLET ORAL at 10:06

## 2018-12-14 RX ADMIN — MEMANTINE 10 MG: 10 TABLET ORAL at 19:49

## 2018-12-14 RX ADMIN — GABAPENTIN 100 MG: 100 CAPSULE ORAL at 10:06

## 2018-12-14 RX ADMIN — ASPIRIN 81 MG 81 MG: 81 TABLET ORAL at 10:06

## 2018-12-14 RX ADMIN — APIXABAN 2.5 MG: 5 TABLET, FILM COATED ORAL at 10:06

## 2018-12-14 RX ADMIN — CITALOPRAM HYDROBROMIDE 20 MG: 20 TABLET ORAL at 10:06

## 2018-12-14 RX ADMIN — NYSTATIN AND TRIAMCINOLONE ACETONIDE: 100000; 1 CREAM TOPICAL at 10:06

## 2018-12-14 RX ADMIN — PRIMIDONE 50 MG: 50 TABLET ORAL at 19:49

## 2018-12-14 RX ADMIN — OXYCODONE HYDROCHLORIDE AND ACETAMINOPHEN 500 MG: 500 TABLET ORAL at 10:06

## 2018-12-14 RX ADMIN — GABAPENTIN 100 MG: 100 CAPSULE ORAL at 14:35

## 2018-12-14 RX ADMIN — APIXABAN 2.5 MG: 5 TABLET, FILM COATED ORAL at 19:48

## 2018-12-14 ASSESSMENT — PAIN SCALES - PAIN ASSESSMENT IN ADVANCED DEMENTIA (PAINAD)
CONSOLABILITY: 1
BREATHING: 0
TOTALSCORE: 3
CONSOLABILITY: 1
TOTALSCORE: 1
BREATHING: 0
CONSOLABILITY: 0
BODYLANGUAGE: 1
FACIALEXPRESSION: 0
BODYLANGUAGE: 1
FACIALEXPRESSION: 0
BREATHING: 0
BODYLANGUAGE: 0
BODYLANGUAGE: 1
FACIALEXPRESSION: 0
BREATHING: 0
BREATHING: 0
NEGVOCALIZATION: 1
BREATHING: 0
CONSOLABILITY: 0
TOTALSCORE: 3
TOTALSCORE: 3
BODYLANGUAGE: 1
NEGVOCALIZATION: 1
NEGVOCALIZATION: 1
CONSOLABILITY: 1
NEGVOCALIZATION: 1
BODYLANGUAGE: 1
NEGVOCALIZATION: 1
FACIALEXPRESSION: 0
BODYLANGUAGE: 0
BREATHING: 0
FACIALEXPRESSION: 0
TOTALSCORE: 1
TOTALSCORE: 3
CONSOLABILITY: 1
FACIALEXPRESSION: 0
NEGVOCALIZATION: 1
CONSOLABILITY: 0
TOTALSCORE: 2
FACIALEXPRESSION: 0
NEGVOCALIZATION: 1

## 2018-12-14 ASSESSMENT — PAIN SCALES - GENERAL: PAINLEVEL_OUTOF10: 0
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
## 2018-12-14 NOTE — PROGRESS NOTES
4 Eyes Admission Assessment     I agree as the admission nurse that 2 RN's have performed a thorough Head to Toe Skin Assessment on the patient. ALL assessment sites listed below have been assessed on admission. Areas assessed by both nurses: yes  [x]   Head, Face, and Ears   [x]   Shoulders, Back, and Chest  [x]   Arms, Elbows, and Hands   [x]   Coccyx, Sacrum, and Ischum  [x]   Legs, Feet, and Heels        Does the Patient have Skin Breakdown? No -Pt has bruising on left lower extremity along with bump where she ran into something, also has a rash on her back.       Anuj Prevention initiated:  Yes   Wound Care Orders initiated:  No      C nurse consulted for Pressure Injury (Stage 3,4, Unstageable, DTI, NWPT, and Complex wounds):  No      Nurse 1 eSignature: Electronically signed by Johnathon Mistry RN on 12/14/18 at 12:03 PM    **SHARE this note so that the co-signing nurse is able to place an eSignature**    Nurse 2 eSignature: Electronically signed by Citlalli Oro RN on 12/14/18 at 2:08 PM

## 2018-12-14 NOTE — CARE COORDINATION
Patient in observation status. Sw aware of patient's admit from home. Per chart review, patient's son lives with her but works. She is active with [address] Await therapy eval. MD spoke with Zunilda regarding dc plan--return home with [address] and APS referral verses acute rehab if patient qualifies. Electronically signed by Yaz Bell on 12/14/2018 at 12:13 PM    2:11 PM  Zunilda spoke with Ann Cevallos on [facility] regarding acute rehab order and therapy just completed seeing patient. She reported that acute rehab would review patient for admission. Electronically signed by Yaz Bell on 12/14/2018 at 2:12 PM    3:54 PM  Ann Cevallos on acute rehab plans to reach out to son to determine patient's support at home after dc. Electronically signed by Yaz Bell on 12/14/2018 at 3:55 PM    4:03 PM  Sw left a message for patient's son, Shelly Walker ([phone])--EH patient name was left in message: Sw reviewed therapy recommendations for continued therapy at dc either acute rehab if patient qualifies or skilled level of care. Sw also left information that medicare would not cover a skilled level of care at a facility as patient needs a 3 midnight qualifying inpatient acute hospital stay. 4:12 PM  Son called Sw back. Sw reviewed therapy recommendations with son and explained acute rehab and skilled nursing and medicare guidelines. Sw spoke with son about long term placement or private duty nursing in the home. Son reported that patient would not be able to afford private duty nursing or long term placement. He also reported that he has not explored long term placement because patient becomes very upset about the topic. Zunilda spoke with son about medicaid to cover cost of nursing home. Son reported that he has lived with his mother for the past 7 years and would have no home if patient goes to a nursing home under medicaid and her house has to be sold.  He wanted to know if he could

## 2018-12-14 NOTE — PROGRESS NOTES
Occupational Therapy   Occupational Therapy Initial Assessment  Date: 2018   Patient Name: Joshua Durán  MRN: [id number]     : 1930    Date of Service: 2018    Assessment: Pt is 80 y.o. female admitted with AMS. Pt lives with son who is gone during the day (employment) and receives assistance with house maintenance as well as UB/LB bathing. Pt was independent in all other ADLs as well as fxl mobility and fxl transfers with use of RW. This date, pt performing below baseline being most limited by decreased cog, safety awareness, fxl mobility. Pt participated in tx session and required CGA for fxl mobility with use of RW, grooming; Mod A for toileting tasks; Min A for toilet transfer; SBA for bed mobility. Pt will benefit from therapy while in acute setting; recommend continued therapy 5-7x/week at d/c. Discharge Recommendations:  Patient would benefit from continued therapy after discharge, 5-7 sessions per week     Joshua Durán scored a 18/24 on the AM-PAC ADL Inpatient form. Current research shows that an AM-PAC score of 17 or less is typically not associated with a discharge to the patient's home setting. Based on the patients AM-PAC score and their current ADL deficits, it is recommended that the patient have 5-7 sessions per week of Occupational Therapy at d/c to increase the patients independence. Patient Diagnosis(es): The encounter diagnosis was Altered mental status, unspecified altered mental status type. has a past medical history of Abnormal laboratory test result; Anemia; Anxiety; Aortic stenosis; Arthritis; Cataract; Dementia; Depression; Duodenitis; Episcleritis/scleritis; Fracture bone; HBP (high blood pressure); Hypothyroidism; Lupus (systemic lupus erythematosus) (HCC); MRSA (methicillin resistant staph aureus) culture positive; Neuropathy; Osteoarthritis; PUD (peptic ulcer disease); Thyroid disease; Tremor; and Venous stasis.    has a past surgical history states she no longer has her glasses \"because one of the lenses fell out, so my son threw them away. \" Reliability of this questioned. )    Orientation  Overall Orientation Status: Impaired  Orientation Level: Oriented to person;Disoriented to time;Disoriented to place     Balance  Sitting Balance: Stand by assistance (EOB)  Standing Balance: Contact guard assistance (with use of RW)  Standing Balance  Sit to stand: Contact guard assistance (EOB>RW)  Stand to sit: Contact guard assistance (RW>recliner)  Functional Mobility  Functional - Mobility Device: Rolling Walker  Activity: To/from bathroom  Assist Level: Contact guard assistance  Functional Mobility Comments: Pt required CGA to perform fxl mobility with use of RW; no LOB noted  Toilet Transfers  Toilet - Technique: Ambulating (with RW)  Equipment Used: Grab bars  Toilet Transfer: Minimal assistance  Toilet Transfers Comments: Pt required Min A to complete toilet transfer with use of grab bars  ADL  Feeding: Setup  Grooming: Contact guard assistance (Pt completed hand hygiene while standing sinkside)  Toileting: Moderate assistance (Pt required verbal/tactile cues for clothing management)  Additional Comments: Pt was independent in feeding, grooming, UB/LB dressing, and toileting; required assistance with UB/LB bathing at baseline; anticipate pt require Min A for UB/LB bathing/dressing based on decreased cog, balance, activity tolerance  Coordination  Movements Are Fluid And Coordinated: No  Coordination and Movement description: Resting tremors; Intention tremors;Right UE;Left UE     Bed mobility  Supine to Sit: Stand by assistance  Sit to Supine: Unable to assess  Transfers  Sit to stand: Contact guard assistance (EOB>RW)  Stand to sit: Contact guard assistance (RW>recliner)  Transfer Comments: Pt steady throughout     Cognition  Overall Cognitive Status: Exceptions  Memory: Decreased recall of biographical Information;Decreased short term memory  Problem
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
## 2018-12-15 LAB
A/G RATIO: 0.9 (ref 1.1–2.2)
ALBUMIN SERPL-MCNC: 3 G/DL (ref 3.4–5)
ALP BLD-CCNC: 60 U/L (ref 40–129)
ALT SERPL-CCNC: 11 U/L (ref 10–40)
ANION GAP SERPL CALCULATED.3IONS-SCNC: 8 MMOL/L (ref 3–16)
AST SERPL-CCNC: 17 U/L (ref 15–37)
BASOPHILS ABSOLUTE: 0 K/UL (ref 0–0.2)
BASOPHILS RELATIVE PERCENT: 0.5 %
BILIRUB SERPL-MCNC: <0.2 MG/DL (ref 0–1)
BUN BLDV-MCNC: 22 MG/DL (ref 7–20)
CALCIUM SERPL-MCNC: 8.6 MG/DL (ref 8.3–10.6)
CHLORIDE BLD-SCNC: 108 MMOL/L (ref 99–110)
CO2: 26 MMOL/L (ref 21–32)
CREAT SERPL-MCNC: 0.9 MG/DL (ref 0.6–1.2)
EOSINOPHILS ABSOLUTE: 0.4 K/UL (ref 0–0.6)
EOSINOPHILS RELATIVE PERCENT: 8.4 %
GFR AFRICAN AMERICAN: >60
GFR NON-AFRICAN AMERICAN: 59
GLOBULIN: 3.2 G/DL
GLUCOSE BLD-MCNC: 91 MG/DL (ref 70–99)
HCT VFR BLD CALC: 30.7 % (ref 36–48)
HEMOGLOBIN: 10.1 G/DL (ref 12–16)
LYMPHOCYTES ABSOLUTE: 0.9 K/UL (ref 1–5.1)
LYMPHOCYTES RELATIVE PERCENT: 21.6 %
MCH RBC QN AUTO: 27.8 PG (ref 26–34)
MCHC RBC AUTO-ENTMCNC: 32.9 G/DL (ref 31–36)
MCV RBC AUTO: 84.6 FL (ref 80–100)
MONOCYTES ABSOLUTE: 0.5 K/UL (ref 0–1.3)
MONOCYTES RELATIVE PERCENT: 11.7 %
NEUTROPHILS ABSOLUTE: 2.5 K/UL (ref 1.7–7.7)
NEUTROPHILS RELATIVE PERCENT: 57.8 %
PDW BLD-RTO: 13.9 % (ref 12.4–15.4)
PLATELET # BLD: 149 K/UL (ref 135–450)
PMV BLD AUTO: 7.4 FL (ref 5–10.5)
POTASSIUM SERPL-SCNC: 4.1 MMOL/L (ref 3.5–5.1)
RBC # BLD: 3.63 M/UL (ref 4–5.2)
SODIUM BLD-SCNC: 142 MMOL/L (ref 136–145)
TOTAL PROTEIN: 6.2 G/DL (ref 6.4–8.2)
WBC # BLD: 4.3 K/UL (ref 4–11)

## 2018-12-15 PROCEDURE — 80053 COMPREHEN METABOLIC PANEL: CPT

## 2018-12-15 PROCEDURE — G0378 HOSPITAL OBSERVATION PER HR: HCPCS

## 2018-12-15 PROCEDURE — 6370000000 HC RX 637 (ALT 250 FOR IP): Performed by: INTERNAL MEDICINE

## 2018-12-15 PROCEDURE — 85025 COMPLETE CBC W/AUTO DIFF WBC: CPT

## 2018-12-15 RX ORDER — HALOPERIDOL 1 MG/1
1 TABLET ORAL ONCE
Status: COMPLETED | OUTPATIENT
Start: 2018-12-15 | End: 2018-12-15

## 2018-12-15 RX ADMIN — GABAPENTIN 100 MG: 100 CAPSULE ORAL at 15:37

## 2018-12-15 RX ADMIN — PRIMIDONE 50 MG: 50 TABLET ORAL at 13:07

## 2018-12-15 RX ADMIN — MEMANTINE 10 MG: 10 TABLET ORAL at 21:27

## 2018-12-15 RX ADMIN — APIXABAN 2.5 MG: 5 TABLET, FILM COATED ORAL at 21:24

## 2018-12-15 RX ADMIN — LEVOTHYROXINE SODIUM 25 MCG: 25 TABLET ORAL at 10:35

## 2018-12-15 RX ADMIN — MEMANTINE 10 MG: 10 TABLET ORAL at 10:36

## 2018-12-15 RX ADMIN — NYSTATIN AND TRIAMCINOLONE ACETONIDE: 100000; 1 CREAM TOPICAL at 15:37

## 2018-12-15 RX ADMIN — GABAPENTIN 100 MG: 100 CAPSULE ORAL at 21:24

## 2018-12-15 RX ADMIN — OXYCODONE HYDROCHLORIDE AND ACETAMINOPHEN 500 MG: 500 TABLET ORAL at 10:35

## 2018-12-15 RX ADMIN — LOSARTAN POTASSIUM 50 MG: 50 TABLET ORAL at 10:35

## 2018-12-15 RX ADMIN — GABAPENTIN 100 MG: 100 CAPSULE ORAL at 10:35

## 2018-12-15 RX ADMIN — APIXABAN 2.5 MG: 5 TABLET, FILM COATED ORAL at 10:36

## 2018-12-15 RX ADMIN — HALOPERIDOL 1 MG: 1 TABLET ORAL at 22:22

## 2018-12-15 RX ADMIN — CITALOPRAM HYDROBROMIDE 20 MG: 20 TABLET ORAL at 10:35

## 2018-12-15 RX ADMIN — PRIMIDONE 50 MG: 50 TABLET ORAL at 21:24

## 2018-12-15 RX ADMIN — LOSARTAN POTASSIUM 50 MG: 50 TABLET ORAL at 18:36

## 2018-12-15 RX ADMIN — ASPIRIN 81 MG 81 MG: 81 TABLET ORAL at 10:35

## 2018-12-15 ASSESSMENT — PAIN SCALES - PAIN ASSESSMENT IN ADVANCED DEMENTIA (PAINAD)
BREATHING: 0
CONSOLABILITY: 0
NEGVOCALIZATION: 1
BODYLANGUAGE: 0
FACIALEXPRESSION: 0
NEGVOCALIZATION: 0
BREATHING: 0
CONSOLABILITY: 0
TOTALSCORE: 0
NEGVOCALIZATION: 0
BREATHING: 0
CONSOLABILITY: 0
CONSOLABILITY: 0
FACIALEXPRESSION: 0
BREATHING: 0
NEGVOCALIZATION: 0
CONSOLABILITY: 0
BODYLANGUAGE: 0
BODYLANGUAGE: 0
BREATHING: 0
BREATHING: 0
CONSOLABILITY: 0
CONSOLABILITY: 0
NEGVOCALIZATION: 1
TOTALSCORE: 0
NEGVOCALIZATION: 1
BREATHING: 0
FACIALEXPRESSION: 0
CONSOLABILITY: 0
TOTALSCORE: 1
BODYLANGUAGE: 0
FACIALEXPRESSION: 0
TOTALSCORE: 1
TOTALSCORE: 1
FACIALEXPRESSION: 0
CONSOLABILITY: 0
BREATHING: 0
BODYLANGUAGE: 0
TOTALSCORE: 0
TOTALSCORE: 1
CONSOLABILITY: 0
FACIALEXPRESSION: 0
TOTALSCORE: 1
FACIALEXPRESSION: 0
FACIALEXPRESSION: 0
BODYLANGUAGE: 0
NEGVOCALIZATION: 1
BODYLANGUAGE: 0
TOTALSCORE: 1
TOTALSCORE: 0
NEGVOCALIZATION: 1
FACIALEXPRESSION: 0
BODYLANGUAGE: 0
BODYLANGUAGE: 0
NEGVOCALIZATION: 1
BODYLANGUAGE: 0
TOTALSCORE: 1
CONSOLABILITY: 0
BREATHING: 0
CONSOLABILITY: 0
NEGVOCALIZATION: 0
BREATHING: 0
NEGVOCALIZATION: 1
NEGVOCALIZATION: 1
BODYLANGUAGE: 0
FACIALEXPRESSION: 0
CONSOLABILITY: 0
BODYLANGUAGE: 0
BODYLANGUAGE: 0
TOTALSCORE: 1
NEGVOCALIZATION: 0
TOTALSCORE: 0
BREATHING: 0
FACIALEXPRESSION: 0

## 2018-12-15 ASSESSMENT — ENCOUNTER SYMPTOMS
GASTROINTESTINAL NEGATIVE: 1
WHEEZING: 0
SHORTNESS OF BREATH: 0
TROUBLE SWALLOWING: 0
COUGH: 0
CHEST TIGHTNESS: 0

## 2018-12-15 ASSESSMENT — PAIN SCALES - GENERAL
PAINLEVEL_OUTOF10: 0
PAINLEVEL_OUTOF10: 0

## 2018-12-15 NOTE — PLAN OF CARE
Problem: Falls - Risk of:  Goal: Will remain free from falls  Will remain free from falls      Outcome: Ongoing  Fall risk assessment completed. Fall precautions in place. Call light within reach. Pt educated on calling for assistance before getting up. Walkway free of clutter. Will continue to monitor. Electronically signed by Natalia Dunbar RN on 12/15/2018 at 12:57 AM    Problem: Pain:  Goal: Pain level will decrease  Pain level will decrease   Outcome: Ongoing  Pt assessed for pain. Pt denies any pain at this time. Will continue to monitor pt and assess for pain throughout rest of shift. Electronically signed by Natalia Dunbar RN on 12/15/2018 at 12:57 AM    Problem: Confusion - Acute:  Goal: Mental status will be restored to baseline  Mental status will be restored to baseline     Outcome: Ongoing  Patient was able to swallow her evening medications without any complications. Patient was alert and oriented to place, time and person. Patient needed redirecting to the situation . Electronically signed by Natalia Dunbar RN on 12/15/2018 at 1:00 AM    Problem: Injury - Risk of, Physical Injury:  Goal: Will remain free from falls  Will remain free from falls      Outcome: Ongoing  Fall risk assessment completed. Fall precautions in place. Call light within reach. Pt educated on calling for assistance before getting up. Walkway free of clutter. Will continue to monitor.    Electronically signed by Natalia Dunbar RN on 12/15/2018 at 12:57 AM
improve    Outcome: Ongoing  Pt has consult to inpatient physical rehab. Electronically signed by Harlo Snellen, RN on 12/14/2018 at 12:07 PM    Goal: Participates in care planning  Participates in care planning    Outcome: Ongoing      Problem: Injury - Risk of, Physical Injury:  Goal: Absence of physical injury  Absence of physical injury     Outcome: Ongoing  Pt has had no injuries during admission. Electronically signed by Harlo Snellen, RN on 12/14/2018 at 12:08 PM    Goal: Will remain free from falls  Will remain free from falls     Outcome: Ongoing  Fall risk assessment completed. Fall precautions in place. Call light within reach. Pt educated on calling for assistance before getting up. Walkway free of clutter. Will continue to monitor.      Electronically signed by Harlo Snellen, RN on 12/14/2018 at 12:05 PM

## 2018-12-15 NOTE — CARE COORDINATION
See SW note of 12/14. Informed by [facility] that they will not be accepting patient as she has no 24 / 7 caregiver upon d/c from rehab. Spoke at length with son X 2 today. We have reviewed (again) all options of NH placement vs home care. Patient cannot afford to pay privately for a facility and prefers to be home anyway as does her son if possible. She is currently active with [facility] and he would like this resumed. He plans to pursue looking into adult day care for her so she is not home alone while he works. He will also pursue looking into medicaid / property issues unique to their situation for future reference and planning purposes. Patient is also active with the COA and has an aide for personal care twice weekly. She also has lifeline. Son sets up her meds everyday and has food available for her to eat while he is away at work. Plan at present is for return to home with [facility] services continuing. Need home care order and KIERRA please. Will fax upon d/c anticipated tomorrow 12/16.    Electronically signed by Collins Arizmendi on 12/15/2018 at 4:46 PM   #[id number]
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
## 2018-12-16 LAB
A/G RATIO: 1 (ref 1.1–2.2)
ALBUMIN SERPL-MCNC: 3 G/DL (ref 3.4–5)
ALP BLD-CCNC: 58 U/L (ref 40–129)
ALT SERPL-CCNC: 10 U/L (ref 10–40)
ANION GAP SERPL CALCULATED.3IONS-SCNC: 9 MMOL/L (ref 3–16)
AST SERPL-CCNC: 17 U/L (ref 15–37)
BASOPHILS ABSOLUTE: 0 K/UL (ref 0–0.2)
BASOPHILS RELATIVE PERCENT: 0.4 %
BILIRUB SERPL-MCNC: <0.2 MG/DL (ref 0–1)
BUN BLDV-MCNC: 27 MG/DL (ref 7–20)
CALCIUM SERPL-MCNC: 8.5 MG/DL (ref 8.3–10.6)
CHLORIDE BLD-SCNC: 109 MMOL/L (ref 99–110)
CO2: 24 MMOL/L (ref 21–32)
CREAT SERPL-MCNC: 1 MG/DL (ref 0.6–1.2)
EOSINOPHILS ABSOLUTE: 0.3 K/UL (ref 0–0.6)
EOSINOPHILS RELATIVE PERCENT: 6.9 %
FERRITIN: 74.8 NG/ML (ref 15–150)
FOLATE: >20 NG/ML (ref 4.78–24.2)
GFR AFRICAN AMERICAN: >60
GFR NON-AFRICAN AMERICAN: 52
GLOBULIN: 3.1 G/DL
GLUCOSE BLD-MCNC: 94 MG/DL (ref 70–99)
HAPTOGLOBIN: 53 MG/DL (ref 30–200)
HCT VFR BLD CALC: 29.8 % (ref 36–48)
HEMOGLOBIN: 9.7 G/DL (ref 12–16)
IRON SATURATION: 24 % (ref 15–50)
IRON: 59 UG/DL (ref 37–145)
LACTATE DEHYDROGENASE: 143 U/L (ref 100–190)
LYMPHOCYTES ABSOLUTE: 1.1 K/UL (ref 1–5.1)
LYMPHOCYTES RELATIVE PERCENT: 21 %
MCH RBC QN AUTO: 27.6 PG (ref 26–34)
MCHC RBC AUTO-ENTMCNC: 32.5 G/DL (ref 31–36)
MCV RBC AUTO: 85.1 FL (ref 80–100)
MONOCYTES ABSOLUTE: 0.6 K/UL (ref 0–1.3)
MONOCYTES RELATIVE PERCENT: 11.9 %
NEUTROPHILS ABSOLUTE: 3 K/UL (ref 1.7–7.7)
NEUTROPHILS RELATIVE PERCENT: 59.8 %
PDW BLD-RTO: 14.3 % (ref 12.4–15.4)
PLATELET # BLD: 142 K/UL (ref 135–450)
PMV BLD AUTO: 7.3 FL (ref 5–10.5)
POTASSIUM SERPL-SCNC: 4.2 MMOL/L (ref 3.5–5.1)
RBC # BLD: 3.5 M/UL (ref 4–5.2)
SODIUM BLD-SCNC: 142 MMOL/L (ref 136–145)
TOTAL IRON BINDING CAPACITY: 241 UG/DL (ref 260–445)
TOTAL PROTEIN: 6.1 G/DL (ref 6.4–8.2)
TSH SERPL DL<=0.05 MIU/L-ACNC: 3.13 UIU/ML (ref 0.27–4.2)
VITAMIN B-12: 701 PG/ML (ref 211–911)
WBC # BLD: 5 K/UL (ref 4–11)

## 2018-12-16 PROCEDURE — 83550 IRON BINDING TEST: CPT

## 2018-12-16 PROCEDURE — 6370000000 HC RX 637 (ALT 250 FOR IP): Performed by: INTERNAL MEDICINE

## 2018-12-16 PROCEDURE — 99232 SBSQ HOSP IP/OBS MODERATE 35: CPT | Performed by: INTERNAL MEDICINE

## 2018-12-16 PROCEDURE — 82728 ASSAY OF FERRITIN: CPT

## 2018-12-16 PROCEDURE — G0378 HOSPITAL OBSERVATION PER HR: HCPCS

## 2018-12-16 PROCEDURE — 82746 ASSAY OF FOLIC ACID SERUM: CPT

## 2018-12-16 PROCEDURE — 80053 COMPREHEN METABOLIC PANEL: CPT

## 2018-12-16 PROCEDURE — 82607 VITAMIN B-12: CPT

## 2018-12-16 PROCEDURE — 83540 ASSAY OF IRON: CPT

## 2018-12-16 PROCEDURE — 83615 LACTATE (LD) (LDH) ENZYME: CPT

## 2018-12-16 PROCEDURE — 83010 ASSAY OF HAPTOGLOBIN QUANT: CPT

## 2018-12-16 PROCEDURE — 84443 ASSAY THYROID STIM HORMONE: CPT

## 2018-12-16 PROCEDURE — 36415 COLL VENOUS BLD VENIPUNCTURE: CPT

## 2018-12-16 PROCEDURE — 85025 COMPLETE CBC W/AUTO DIFF WBC: CPT

## 2018-12-16 RX ORDER — HALOPERIDOL 1 MG/1
1 TABLET ORAL NIGHTLY
Status: DISCONTINUED | OUTPATIENT
Start: 2018-12-16 | End: 2018-12-17 | Stop reason: HOSPADM

## 2018-12-16 RX ORDER — HALOPERIDOL 1 MG/1
0.5 TABLET ORAL EVERY 6 HOURS PRN
Status: DISCONTINUED | OUTPATIENT
Start: 2018-12-16 | End: 2018-12-17 | Stop reason: HOSPADM

## 2018-12-16 RX ADMIN — OXYCODONE HYDROCHLORIDE AND ACETAMINOPHEN 500 MG: 500 TABLET ORAL at 11:21

## 2018-12-16 RX ADMIN — LOSARTAN POTASSIUM 50 MG: 50 TABLET ORAL at 11:20

## 2018-12-16 RX ADMIN — NYSTATIN AND TRIAMCINOLONE ACETONIDE: 100000; 1 CREAM TOPICAL at 11:23

## 2018-12-16 RX ADMIN — GABAPENTIN 100 MG: 100 CAPSULE ORAL at 19:56

## 2018-12-16 RX ADMIN — PRIMIDONE 50 MG: 50 TABLET ORAL at 19:56

## 2018-12-16 RX ADMIN — MEMANTINE 10 MG: 10 TABLET ORAL at 20:22

## 2018-12-16 RX ADMIN — PRIMIDONE 50 MG: 50 TABLET ORAL at 11:21

## 2018-12-16 RX ADMIN — HALOPERIDOL 1 MG: 1 TABLET ORAL at 19:56

## 2018-12-16 RX ADMIN — GABAPENTIN 100 MG: 100 CAPSULE ORAL at 11:21

## 2018-12-16 RX ADMIN — GABAPENTIN 100 MG: 100 CAPSULE ORAL at 13:53

## 2018-12-16 RX ADMIN — LEVOTHYROXINE SODIUM 25 MCG: 25 TABLET ORAL at 11:21

## 2018-12-16 RX ADMIN — MEMANTINE 10 MG: 10 TABLET ORAL at 11:59

## 2018-12-16 RX ADMIN — LOSARTAN POTASSIUM 50 MG: 50 TABLET ORAL at 16:59

## 2018-12-16 RX ADMIN — APIXABAN 2.5 MG: 5 TABLET, FILM COATED ORAL at 19:55

## 2018-12-16 RX ADMIN — APIXABAN 2.5 MG: 5 TABLET, FILM COATED ORAL at 11:20

## 2018-12-16 RX ADMIN — ASPIRIN 81 MG 81 MG: 81 TABLET ORAL at 11:21

## 2018-12-16 RX ADMIN — CITALOPRAM HYDROBROMIDE 20 MG: 20 TABLET ORAL at 11:21

## 2018-12-16 ASSESSMENT — PAIN SCALES - PAIN ASSESSMENT IN ADVANCED DEMENTIA (PAINAD)
CONSOLABILITY: 0
FACIALEXPRESSION: 0
BODYLANGUAGE: 0
NEGVOCALIZATION: 0
BREATHING: 0
BODYLANGUAGE: 0
TOTALSCORE: 0
BODYLANGUAGE: 0
TOTALSCORE: 0
BREATHING: 0
CONSOLABILITY: 0
BODYLANGUAGE: 0
NEGVOCALIZATION: 0
FACIALEXPRESSION: 0
FACIALEXPRESSION: 0
BODYLANGUAGE: 0
TOTALSCORE: 0
BREATHING: 0
BREATHING: 0
FACIALEXPRESSION: 0
TOTALSCORE: 0
FACIALEXPRESSION: 0
BREATHING: 0
BREATHING: 0
TOTALSCORE: 0
NEGVOCALIZATION: 0
NEGVOCALIZATION: 0
CONSOLABILITY: 0
CONSOLABILITY: 0
NEGVOCALIZATION: 0
TOTALSCORE: 0
FACIALEXPRESSION: 0
CONSOLABILITY: 0
NEGVOCALIZATION: 0
CONSOLABILITY: 0
BODYLANGUAGE: 0

## 2018-12-16 ASSESSMENT — ENCOUNTER SYMPTOMS
CHEST TIGHTNESS: 0
COUGH: 0
SHORTNESS OF BREATH: 0
GASTROINTESTINAL NEGATIVE: 1
WHEEZING: 0
TROUBLE SWALLOWING: 0

## 2018-12-16 ASSESSMENT — PAIN SCALES - GENERAL
PAINLEVEL_OUTOF10: 0

## 2018-12-16 NOTE — PROGRESS NOTES
IM Progress Note      Subjective:confusional state    81 yo wf who has had a lot of difficulty overnight with disorientation, agitation and  Distress. Haldol did help calm her last night but this morning she is worse again. Talking on phone to no one, almost crying. [place] relieved when I told her she could stay another day. Also remembered eating breakfast but hungry again, nurse says she eats frequently. But small amounts, only 25% of breakfast..    Review of Systems:  Review of Systems   Constitutional: Negative for activity change, appetite change, fever and unexpected weight change. HENT: Negative for trouble swallowing. Respiratory: Negative for cough, chest tightness, shortness of breath and wheezing. Cardiovascular: Negative for chest pain, palpitations and leg swelling. Gastrointestinal: Negative. Genitourinary: Negative. Neurological: Negative for dizziness, light-headedness and headaches. Psychiatric/Behavioral: Positive for confusion. Negative for behavioral problems. The patient is not nervous/anxious. Objective:    BP (!) 165/69   Pulse 59   Temp 97.9 °F (36.6 °C) (Oral)   Resp 16   Wt 138 lb 14.2 oz (63 kg)   SpO2 99%   BMI 24.60 kg/m²     Intake/Output Summary (Last 24 hours) at 12/16/18 1056  Last data filed at 12/16/18 1201   Gross per 24 hour   Intake              240 ml   Output                0 ml   Net              240 ml       Physical Exam   Constitutional: She is oriented to person, place, and time. No distress. Eyes: Pupils are equal, round, and reactive to light. Conjunctivae and EOM are normal. No scleral icterus. Neck: No JVD present. No thyromegaly present. Cardiovascular: Normal rate and intact distal pulses. Exam reveals no gallop. Murmur heard. Pulmonary/Chest: Breath sounds normal. No respiratory distress. She has no wheezes. She has no rales. Musculoskeletal: She exhibits no edema.    Neurological: She is alert and oriented to

## 2018-12-16 NOTE — PROGRESS NOTES
Patient alert to self and place. States she has pain but is unable to rate it or say where the pain is. Morning medications given. Son updated on discharge being delayed until tomorrow. Patient tolerating PO fluids. Call light and belongings within reach. Bed alarm on. Able to make needs known sometimes. Educated on use of call light for assistance.

## 2018-12-17 VITALS
RESPIRATION RATE: 16 BRPM | HEART RATE: 55 BPM | BODY MASS INDEX: 23.55 KG/M2 | TEMPERATURE: 98.2 F | WEIGHT: 132.94 LBS | DIASTOLIC BLOOD PRESSURE: 59 MMHG | OXYGEN SATURATION: 93 % | SYSTOLIC BLOOD PRESSURE: 135 MMHG

## 2018-12-17 LAB
A/G RATIO: 1 (ref 1.1–2.2)
ALBUMIN SERPL-MCNC: 3 G/DL (ref 3.4–5)
ALP BLD-CCNC: 59 U/L (ref 40–129)
ALT SERPL-CCNC: 10 U/L (ref 10–40)
ANION GAP SERPL CALCULATED.3IONS-SCNC: 10 MMOL/L (ref 3–16)
AST SERPL-CCNC: 15 U/L (ref 15–37)
BASOPHILS ABSOLUTE: 0.1 K/UL (ref 0–0.2)
BASOPHILS RELATIVE PERCENT: 1.1 %
BILIRUB SERPL-MCNC: <0.2 MG/DL (ref 0–1)
BUN BLDV-MCNC: 24 MG/DL (ref 7–20)
CALCIUM SERPL-MCNC: 8.4 MG/DL (ref 8.3–10.6)
CHLORIDE BLD-SCNC: 110 MMOL/L (ref 99–110)
CO2: 23 MMOL/L (ref 21–32)
CREAT SERPL-MCNC: 0.8 MG/DL (ref 0.6–1.2)
EOSINOPHILS ABSOLUTE: 0.4 K/UL (ref 0–0.6)
EOSINOPHILS RELATIVE PERCENT: 9.2 %
GFR AFRICAN AMERICAN: >60
GFR NON-AFRICAN AMERICAN: >60
GLOBULIN: 2.9 G/DL
GLUCOSE BLD-MCNC: 90 MG/DL (ref 70–99)
HCT VFR BLD CALC: 29.9 % (ref 36–48)
HEMOGLOBIN: 9.8 G/DL (ref 12–16)
LYMPHOCYTES ABSOLUTE: 1 K/UL (ref 1–5.1)
LYMPHOCYTES RELATIVE PERCENT: 21.4 %
MCH RBC QN AUTO: 27.9 PG (ref 26–34)
MCHC RBC AUTO-ENTMCNC: 32.7 G/DL (ref 31–36)
MCV RBC AUTO: 85.1 FL (ref 80–100)
MONOCYTES ABSOLUTE: 0.5 K/UL (ref 0–1.3)
MONOCYTES RELATIVE PERCENT: 11.6 %
NEUTROPHILS ABSOLUTE: 2.5 K/UL (ref 1.7–7.7)
NEUTROPHILS RELATIVE PERCENT: 56.7 %
PDW BLD-RTO: 14 % (ref 12.4–15.4)
PLATELET # BLD: 142 K/UL (ref 135–450)
PMV BLD AUTO: 7.3 FL (ref 5–10.5)
POTASSIUM SERPL-SCNC: 4.3 MMOL/L (ref 3.5–5.1)
RBC # BLD: 3.51 M/UL (ref 4–5.2)
SODIUM BLD-SCNC: 143 MMOL/L (ref 136–145)
TOTAL PROTEIN: 5.9 G/DL (ref 6.4–8.2)
WBC # BLD: 4.5 K/UL (ref 4–11)

## 2018-12-17 PROCEDURE — G8978 MOBILITY CURRENT STATUS: HCPCS

## 2018-12-17 PROCEDURE — 36415 COLL VENOUS BLD VENIPUNCTURE: CPT

## 2018-12-17 PROCEDURE — 80053 COMPREHEN METABOLIC PANEL: CPT

## 2018-12-17 PROCEDURE — 99239 HOSP IP/OBS DSCHRG MGMT >30: CPT | Performed by: INTERNAL MEDICINE

## 2018-12-17 PROCEDURE — G0378 HOSPITAL OBSERVATION PER HR: HCPCS

## 2018-12-17 PROCEDURE — 85025 COMPLETE CBC W/AUTO DIFF WBC: CPT

## 2018-12-17 PROCEDURE — 97110 THERAPEUTIC EXERCISES: CPT

## 2018-12-17 PROCEDURE — 6370000000 HC RX 637 (ALT 250 FOR IP): Performed by: INTERNAL MEDICINE

## 2018-12-17 PROCEDURE — 97530 THERAPEUTIC ACTIVITIES: CPT

## 2018-12-17 PROCEDURE — 97116 GAIT TRAINING THERAPY: CPT

## 2018-12-17 RX ADMIN — PRIMIDONE 50 MG: 50 TABLET ORAL at 10:40

## 2018-12-17 RX ADMIN — CITALOPRAM HYDROBROMIDE 20 MG: 20 TABLET ORAL at 10:40

## 2018-12-17 RX ADMIN — ASPIRIN 81 MG 81 MG: 81 TABLET ORAL at 10:40

## 2018-12-17 RX ADMIN — APIXABAN 2.5 MG: 5 TABLET, FILM COATED ORAL at 10:40

## 2018-12-17 RX ADMIN — LEVOTHYROXINE SODIUM 25 MCG: 25 TABLET ORAL at 10:30

## 2018-12-17 RX ADMIN — LOSARTAN POTASSIUM 50 MG: 50 TABLET ORAL at 10:40

## 2018-12-17 RX ADMIN — GABAPENTIN 100 MG: 100 CAPSULE ORAL at 13:23

## 2018-12-17 RX ADMIN — NYSTATIN AND TRIAMCINOLONE ACETONIDE: 100000; 1 CREAM TOPICAL at 10:40

## 2018-12-17 RX ADMIN — GABAPENTIN 100 MG: 100 CAPSULE ORAL at 10:40

## 2018-12-17 RX ADMIN — OXYCODONE HYDROCHLORIDE AND ACETAMINOPHEN 500 MG: 500 TABLET ORAL at 10:40

## 2018-12-17 RX ADMIN — MEMANTINE 10 MG: 10 TABLET ORAL at 10:43

## 2018-12-17 ASSESSMENT — PAIN SCALES - PAIN ASSESSMENT IN ADVANCED DEMENTIA (PAINAD)
BREATHING: 0
NEGVOCALIZATION: 0
NEGVOCALIZATION: 0
FACIALEXPRESSION: 0
CONSOLABILITY: 0
FACIALEXPRESSION: 0
TOTALSCORE: 0
TOTALSCORE: 0
BODYLANGUAGE: 0
BODYLANGUAGE: 0
CONSOLABILITY: 0
BREATHING: 0

## 2018-12-17 ASSESSMENT — PAIN SCALES - GENERAL: PAINLEVEL_OUTOF10: 0
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
## 2018-12-24 RX ORDER — CITALOPRAM 20 MG/1
TABLET ORAL
Qty: 30 TABLET | Refills: 5 | Status: SHIPPED | OUTPATIENT
Start: 2018-12-24 | End: 2019-06-24 | Stop reason: SDUPTHER

## 2018-12-24 RX ORDER — LEVOTHYROXINE SODIUM 0.03 MG/1
TABLET ORAL
Qty: 30 TABLET | Refills: 5 | Status: SHIPPED | OUTPATIENT
Start: 2018-12-24 | End: 2019-06-24 | Stop reason: SDUPTHER

## 2018-12-24 RX ORDER — PRIMIDONE 50 MG/1
TABLET ORAL
Qty: 60 TABLET | Refills: 5 | Status: SHIPPED | OUTPATIENT
Start: 2018-12-24 | End: 2019-06-24 | Stop reason: SDUPTHER

## 2018-12-24 RX ORDER — LOSARTAN POTASSIUM 100 MG/1
TABLET ORAL
Qty: 30 TABLET | Refills: 5 | Status: SHIPPED | OUTPATIENT
Start: 2018-12-24 | End: 2019-04-17 | Stop reason: SDUPTHER

## 2018-12-26 LAB
EKG ATRIAL RATE: 58 BPM
EKG DIAGNOSIS: NORMAL
EKG P AXIS: 84 DEGREES
EKG P-R INTERVAL: 208 MS
EKG Q-T INTERVAL: 438 MS
EKG QRS DURATION: 114 MS
EKG QTC CALCULATION (BAZETT): 429 MS
EKG R AXIS: 33 DEGREES
EKG T AXIS: 43 DEGREES
EKG VENTRICULAR RATE: 58 BPM

## 2019-01-15 ENCOUNTER — TELEPHONE (OUTPATIENT)
Dept: CARDIOLOGY CLINIC | Age: 84
End: 2019-01-15

## 2019-01-15 DIAGNOSIS — I35.0 NONRHEUMATIC AORTIC VALVE STENOSIS: Primary | ICD-10-CM

## 2019-01-21 RX ORDER — GABAPENTIN 100 MG/1
CAPSULE ORAL
Qty: 90 CAPSULE | OUTPATIENT
Start: 2019-01-21

## 2019-01-22 RX ORDER — GABAPENTIN 100 MG/1
100 CAPSULE ORAL 3 TIMES DAILY
Qty: 90 CAPSULE | Refills: 0 | Status: SHIPPED | OUTPATIENT
Start: 2019-01-22 | End: 2019-02-18 | Stop reason: SDUPTHER

## 2019-01-22 RX ORDER — FUROSEMIDE 20 MG/1
TABLET ORAL
Qty: 30 TABLET | Refills: 3 | Status: SHIPPED | OUTPATIENT
Start: 2019-01-22 | End: 2019-05-22 | Stop reason: SDUPTHER

## 2019-02-18 RX ORDER — GABAPENTIN 100 MG/1
CAPSULE ORAL
Qty: 90 CAPSULE | Refills: 0 | Status: ON HOLD | OUTPATIENT
Start: 2019-02-18 | End: 2019-05-01

## 2019-04-01 NOTE — PATIENT INSTRUCTIONS
AS   Date EF Detail   Sx   SOB   Hx 2/18  TAVR with ES3 23mm   NYHA   []I         [x]II           []III          []IV   TTE 10/17  2/18  5/18  4/19 55%  70%  60% AS MG 39 and   TAVR well seated, MG 11  TAVR well seated, MG 27   [facility] 12/18  Nonobstructive   Plan   Continue eliquis for at least 6 months  Yearly echos  FU with Dr. Tuyet Harrington   ~PVD  Ankle ulcer per Dr. Shreya Herrera  ~HTN  Today BP [] Controlled [x] Borderline [] Uncontrolled   Counseling [x] Diet/Salt [x] Exercise [x] Weight    Plan Continue current medications at doses detailed above   Unusual for her, just drank large cup coffee  ~Hyperchol  Goal LDL [] <100 [x] <70     Counseling [x] Diet [x] Weight [x] Exercise   Lipid/liver Monitor [x] PCP [] Cardio [] Endocrine   Plan Continue current doses of meds listed above

## 2019-04-01 NOTE — PROGRESS NOTES
No show. Following historical only. [address]       H+P // CONSULT // OUTPATIENT VISIT // [facility]     Referring Doctor Arvin Jackson MD   Encounter Type Followup     CHIEF COMPLAINT     VisitType [] Acute [x] Chronic     Symptom [x] None [] CP [] SOB [] Dizzy [] Palps [] Fatigue     Problems AS s/p TAVR, PVD, HTN, Chol      HISTORY OF PRESENT ILLNESS          Symptom Y N Frequency Duration Severity Modifying Assoc Sx Other   CP [] [x]         SOB [] [x]         Dizzy [] [x]         Syncope [] [x]         Palpitations [] [x]           COMPLIANCE     Category Meds Diet Salt Exercise Tobacco Alcohol Drugs   Compliant [x] [x] [] [] [x] [x] [x]   [x]Counseling given on all above above categories    HISTORY/ALLERGIES/ROS/MEDICATIONS     MedHx:  has a past medical history of Abnormal laboratory test result, Anemia, Anxiety, Aortic stenosis, Arthritis, Cataract, Dementia, Depression, Duodenitis, Episcleritis/scleritis, Fracture bone, HBP (high blood pressure), Hypothyroidism, Lupus (systemic lupus erythematosus) ([facility] [address] MRSA (methicillin resistant staph aureus) culture positive, Neuropathy, Osteoarthritis, PUD (peptic ulcer disease), Thyroid disease, Tremor, and Venous stasis. SurgHx:  has a past surgical history that includes Cataract removal (Bilateral); Total knee arthroplasty (Left); Carpal tunnel release (Right); Cardiac catheterization (12/18/2017); and Leg Debridement (Left, 10/12/2015). SocHx:   reports that she quit smoking about 51 years ago. She has a 20.00 pack-year smoking history. She has never used smokeless tobacco. She reports that she does not drink alcohol or use drugs. FamHx: family history includes Cancer in her father, maternal grandfather, mother, paternal grandmother, and sister; Heart Disease in her maternal grandmother; High Blood Pressure in her sister; High Cholesterol in her sister.    Allergies: Codeine; Erythromycin; Vicodin [hydrocodone-acetaminophen]; Epinephrine; Calamine; and Nylon   ROS:  [x]Full ROS obtained and negative except as mentioned in HPI   CVMeds: Reviewed with patient and will remain unchanged except as mentioned in A/P      Current Outpatient Medications   Medication Sig Dispense Refill    gabapentin (NEURONTIN) 100 MG capsule TAKE ONE CAPSULE BY MOUTH THREE TIMES A DAY 90 capsule 0    furosemide (LASIX) 20 MG tablet TAKE ONE TABLET BY MOUTH DAILY 30 tablet 3    primidone (MYSOLINE) 50 MG tablet TAKE ONE TABLET BY MOUTH TWICE A DAY 60 tablet 5    losartan (COZAAR) 100 MG tablet TAKE ONE-HALF TABLET BY MOUTH TWICE A DAY 30 tablet 5    levothyroxine (SYNTHROID) 25 MCG tablet TAKE ONE TABLET BY MOUTH DAILY 30 tablet 5    citalopram (CELEXA) 20 MG tablet TAKE ONE TABLET BY MOUTH DAILY 30 tablet 5    losartan (COZAAR) 100 MG tablet Take 50 mg by mouth 2 times daily      vitamin C (ASCORBIC ACID) 500 MG tablet Take 500 mg by mouth daily      Elastic Bandages & Supports (MEDICAL COMPRESSION STOCKINGS) MISC 1 each by Does not apply route daily as needed (HEMATOMA) 1 each 0    memantine (NAMENDA) 10 MG tablet TAKE ONE TABLET BY MOUTH TWICE A DAY 60 tablet 4    apixaban (ELIQUIS) 2.5 MG TABS tablet Take 1 tablet by mouth 2 times daily 56 tablet 0    nystatin-triamcinolone (MYCOLOG II) 093828-2.1 UNIT/GM-% cream Apply topically daily 15 g 0    Multiple Vitamins-Minerals (CENTRUM SILVER 50+WOMEN PO) Take 1 tablet by mouth daily       aspirin 81 MG tablet Take 81 mg by mouth daily      calcium carbonate 600 MG TABS tablet Take 1 tablet by mouth daily       No current facility-administered medications for this visit. PHYSICAL EXAM     There were no vitals filed for this visit.      ASSESSMENT AND PLAN   ~AS   Date EF Detail   Sx   SOB   Hx 2/18  TAVR with ES3 23mm   NYHA   []I         [x]II           []III          []IV   TTE 10/17  2/18  5/18  4/19 55%  70%  60% AS MG 39 and   TAVR well seated, MG 11  TAVR well seated, MG 27   [facility] 12/18  Nonobstructive   Plan   Continue eliquis for at least 6 months  Yearly echos  FU with Dr. Kieran Rodriguez   ~PVD  Ankle ulcer per Dr. Richardson Atwood  ~HTN  Today BP [] Controlled [x] Borderline [] Uncontrolled   Counseling [x] Diet/Salt [x] Exercise [x] Weight    Plan Continue current medications at doses detailed above   Unusual for her, just drank large cup coffee  ~Hyperchol  Goal LDL [] <100 [x] <70     Counseling [x] Diet [x] Weight [x] Exercise   Lipid/liver Monitor [x] PCP [] Cardio [] Endocrine   Plan Continue current doses of meds listed above

## 2019-04-04 ENCOUNTER — OFFICE VISIT (OUTPATIENT)
Dept: CARDIOLOGY CLINIC | Age: 84
End: 2019-04-04
Payer: MEDICARE

## 2019-04-04 DIAGNOSIS — Z95.2 S/P TAVR (TRANSCATHETER AORTIC VALVE REPLACEMENT): ICD-10-CM

## 2019-04-04 DIAGNOSIS — E78.00 HYPERCHOLESTEREMIA: ICD-10-CM

## 2019-04-04 DIAGNOSIS — I35.0 NONRHEUMATIC AORTIC VALVE STENOSIS: Primary | ICD-10-CM

## 2019-04-04 DIAGNOSIS — I73.9 PVD (PERIPHERAL VASCULAR DISEASE) (HCC): ICD-10-CM

## 2019-04-04 DIAGNOSIS — I10 ESSENTIAL HYPERTENSION: ICD-10-CM

## 2019-04-04 PROCEDURE — 99214 OFFICE O/P EST MOD 30 MIN: CPT | Performed by: INTERNAL MEDICINE

## 2019-04-17 RX ORDER — LOSARTAN POTASSIUM 100 MG/1
TABLET ORAL
Qty: 30 TABLET | Refills: 0 | Status: SHIPPED | OUTPATIENT
Start: 2019-04-17 | End: 2019-06-18 | Stop reason: SDUPTHER

## 2019-04-22 RX ORDER — MEMANTINE HYDROCHLORIDE 10 MG/1
TABLET ORAL
Qty: 60 TABLET | Refills: 3 | Status: SHIPPED | OUTPATIENT
Start: 2019-04-22 | End: 2019-06-24 | Stop reason: SDUPTHER

## 2019-04-23 ENCOUNTER — APPOINTMENT (OUTPATIENT)
Dept: GENERAL RADIOLOGY | Age: 84
End: 2019-04-23
Payer: MEDICARE

## 2019-04-23 ENCOUNTER — HOSPITAL ENCOUNTER (EMERGENCY)
Age: 84
Discharge: HOME | End: 2019-04-23
Attending: EMERGENCY MEDICINE
Payer: MEDICARE

## 2019-04-23 VITALS
RESPIRATION RATE: 18 BRPM | WEIGHT: 134.48 LBS | BODY MASS INDEX: 23.82 KG/M2 | DIASTOLIC BLOOD PRESSURE: 62 MMHG | OXYGEN SATURATION: 97 % | TEMPERATURE: 97.6 F | HEART RATE: 52 BPM | SYSTOLIC BLOOD PRESSURE: 153 MMHG

## 2019-04-23 DIAGNOSIS — F03.90 DEMENTIA WITHOUT BEHAVIORAL DISTURBANCE, UNSPECIFIED DEMENTIA TYPE: Primary | ICD-10-CM

## 2019-04-23 LAB
A/G RATIO: 1 (ref 1.1–2.2)
ALBUMIN SERPL-MCNC: 3.5 G/DL (ref 3.4–5)
ALP BLD-CCNC: 79 U/L (ref 40–129)
ALT SERPL-CCNC: 13 U/L (ref 10–40)
ANION GAP SERPL CALCULATED.3IONS-SCNC: 11 MMOL/L (ref 3–16)
AST SERPL-CCNC: 21 U/L (ref 15–37)
BASOPHILS ABSOLUTE: 0 K/UL (ref 0–0.2)
BASOPHILS RELATIVE PERCENT: 0.4 %
BILIRUB SERPL-MCNC: <0.2 MG/DL (ref 0–1)
BILIRUBIN URINE: NEGATIVE
BLOOD, URINE: NEGATIVE
BUN BLDV-MCNC: 19 MG/DL (ref 7–20)
CALCIUM SERPL-MCNC: 8.7 MG/DL (ref 8.3–10.6)
CHLORIDE BLD-SCNC: 109 MMOL/L (ref 99–110)
CLARITY: CLEAR
CO2: 24 MMOL/L (ref 21–32)
COLOR: YELLOW
CREAT SERPL-MCNC: 0.8 MG/DL (ref 0.6–1.2)
EOSINOPHILS ABSOLUTE: 0 K/UL (ref 0–0.6)
EOSINOPHILS RELATIVE PERCENT: 2.2 %
EPITHELIAL CELLS, UA: NORMAL /HPF
GFR AFRICAN AMERICAN: >60
GFR NON-AFRICAN AMERICAN: >60
GLOBULIN: 3.5 G/DL
GLUCOSE BLD-MCNC: 85 MG/DL (ref 70–99)
GLUCOSE BLD-MCNC: 92 MG/DL (ref 70–99)
GLUCOSE URINE: NEGATIVE MG/DL
HCT VFR BLD CALC: 32 % (ref 36–48)
HEMOGLOBIN: 10.8 G/DL (ref 12–16)
KETONES, URINE: NEGATIVE MG/DL
LEUKOCYTE ESTERASE, URINE: ABNORMAL
LIPASE: 50 U/L (ref 13–60)
LYMPHOCYTES ABSOLUTE: 0.7 K/UL (ref 1–5.1)
LYMPHOCYTES RELATIVE PERCENT: 36.8 %
MCH RBC QN AUTO: 28.7 PG (ref 26–34)
MCHC RBC AUTO-ENTMCNC: 33.7 G/DL (ref 31–36)
MCV RBC AUTO: 85.1 FL (ref 80–100)
MICROSCOPIC EXAMINATION: YES
MONOCYTES ABSOLUTE: 0.1 K/UL (ref 0–1.3)
MONOCYTES RELATIVE PERCENT: 4.8 %
NEUTROPHILS ABSOLUTE: 1.1 K/UL (ref 1.7–7.7)
NEUTROPHILS RELATIVE PERCENT: 55.8 %
NITRITE, URINE: NEGATIVE
PDW BLD-RTO: 14.9 % (ref 12.4–15.4)
PERFORMED ON: NORMAL
PH UA: 6.5 (ref 5–8)
PLATELET # BLD: 115 K/UL (ref 135–450)
PMV BLD AUTO: 7.2 FL (ref 5–10.5)
POTASSIUM REFLEX MAGNESIUM: 4.2 MMOL/L (ref 3.5–5.1)
PROTEIN UA: NEGATIVE MG/DL
RBC # BLD: 3.76 M/UL (ref 4–5.2)
SODIUM BLD-SCNC: 144 MMOL/L (ref 136–145)
SPECIFIC GRAVITY UA: 1.02 (ref 1–1.03)
TOTAL PROTEIN: 7 G/DL (ref 6.4–8.2)
TROPONIN: <0.01 NG/ML
URINE REFLEX TO CULTURE: YES
URINE TYPE: ABNORMAL
UROBILINOGEN, URINE: 0.2 E.U./DL
WBC # BLD: 2 K/UL (ref 4–11)
WBC UA: NORMAL /HPF (ref 0–5)

## 2019-04-23 PROCEDURE — 85025 COMPLETE CBC W/AUTO DIFF WBC: CPT

## 2019-04-23 PROCEDURE — 83690 ASSAY OF LIPASE: CPT

## 2019-04-23 PROCEDURE — 99285 EMERGENCY DEPT VISIT HI MDM: CPT

## 2019-04-23 PROCEDURE — 84484 ASSAY OF TROPONIN QUANT: CPT

## 2019-04-23 PROCEDURE — 71046 X-RAY EXAM CHEST 2 VIEWS: CPT

## 2019-04-23 PROCEDURE — 93005 ELECTROCARDIOGRAM TRACING: CPT | Performed by: EMERGENCY MEDICINE

## 2019-04-23 PROCEDURE — 87086 URINE CULTURE/COLONY COUNT: CPT

## 2019-04-23 PROCEDURE — 81001 URINALYSIS AUTO W/SCOPE: CPT

## 2019-04-23 PROCEDURE — 80053 COMPREHEN METABOLIC PANEL: CPT

## 2019-04-23 ASSESSMENT — ENCOUNTER SYMPTOMS
VOMITING: 0
COLOR CHANGE: 0
ABDOMINAL PAIN: 0
PHOTOPHOBIA: 0
SHORTNESS OF BREATH: 0
COUGH: 0
TROUBLE SWALLOWING: 0

## 2019-04-23 NOTE — ED PROVIDER NOTES
disturbance. Respiratory: Negative for cough and shortness of breath. Cardiovascular: Negative for chest pain and palpitations. Gastrointestinal: Negative for abdominal pain and vomiting. Genitourinary: Negative for difficulty urinating and frequency. Musculoskeletal: Negative for gait problem and neck pain. Skin: Negative for color change and rash. Neurological: Negative for dizziness, light-headedness and headaches. Psychiatric/Behavioral: Negative for confusion. The patient is not nervous/anxious. Except as noted above the remainder of the review of systems was reviewed and negative.        PAST MEDICAL HISTORY     Past Medical History:   Diagnosis Date    Abnormal laboratory test result     History of having positive double- stranded DNA &  recently found to be negative with a negitive ANNALEE    Anemia 2014    Follows with Dr. Lisandra Ly Aortic stenosis     mod to severe AS per 10/2017 Echo    Arthritis     Cataract     Dementia     Depression     Duodenitis 2012    EGD for PUD    Episcleritis/scleritis     history of    Fracture bone        Multiple bone fractures and compression fractures    HBP (high blood pressure)     Hypothyroidism     Lupus (systemic lupus erythematosus) (HCC)     MRSA (methicillin resistant staph aureus) culture positive 06/29/2018    urine    Neuropathy     Osteoarthritis     PUD (peptic ulcer disease) 2012    gastric/prepyloric ulcers    Thyroid disease     Tremor     right hand    Venous stasis     left ankle wound; treated by Dr. Gogo Verdugo       Past Surgical History:   Procedure Laterality Date    CARDIAC CATHETERIZATION  12/18/2017    Dr. Malena Shepherd Right     CATARACT REMOVAL Bilateral     LEG DEBRIDEMENT Left 10/12/2015    Dr. Joyce Cohen       Previous Medications    APIXABAN (ELIQUIS) 2.5 MG TABS TABLET    Take 1 tablet by status: Former Smoker     Packs/day: 1.00     Years: 20.00     Pack years: 20.00     Last attempt to quit: 1967     Years since quittin.8    Smokeless tobacco: Never Used   Substance and Sexual Activity    Alcohol use: No    Drug use: No    Sexual activity: Yes     Partners: Male   Lifestyle    Physical activity:     Days per week: None     Minutes per session: None    Stress: None   Relationships    Social connections:     Talks on phone: None     Gets together: None     Attends Druze service: None     Active member of club or organization: None     Attends meetings of clubs or organizations: None     Relationship status: None    Intimate partner violence:     Fear of current or ex partner: None     Emotionally abused: None     Physically abused: None     Forced sexual activity: None   Other Topics Concern    None   Social History Narrative    None       SCREENINGS      @FLOW([id number])@      PHYSICAL EXAM    (up to 7 for level 4, 8 or more for level 5)     ED Triage Vitals [19 1524]   BP Temp Temp Source Pulse Resp SpO2 Height Weight   (!) 167/56 97.6 °F (36.4 °C) Oral 54 18 98 % -- 134 lb 7.7 oz (61 kg)       Physical Exam   Constitutional: She is oriented to person, place, and time. She appears well-developed and well-nourished. HENT:   Head: Normocephalic and atraumatic. Eyes: Pupils are equal, round, and reactive to light. Conjunctivae and EOM are normal.   Neck: Normal range of motion. No tracheal deviation present. Cardiovascular: Normal rate, regular rhythm and normal heart sounds. Pulmonary/Chest: Effort normal and breath sounds normal.   Abdominal: Soft. She exhibits no distension. There is no tenderness. Musculoskeletal: Normal range of motion. She exhibits no edema. Neurological: She is alert and oriented to person, place, and time. No cranial nerve deficit or sensory deficit. She exhibits normal muscle tone. Coordination normal.   Skin: Skin is warm and dry. DIAGNOSTIC RESULTS     EKG: All EKG's are interpreted by the Emergency Department Physician who either signs or Co-signsthis chart in the absence of a cardiologist.    Since EKG demonstrates a sinus rhythm with a ventricular rate of 50. Patient has a prolonged NV interval and QTC intervals within acceptable range. There are new T-wave inversions in the inferior leads and ST depressions in the lateral leads compared to previous. RADIOLOGY:   Non-plain filmimages such as CT, Ultrasound and MRI are read by the radiologist. Plain radiographic images are visualized and preliminarily interpreted by the emergency physician with the below findings:      Interpretation per the Radiologist below, if available at the time ofthis note:    XR CHEST STANDARD (2 VW)    (Results Pending)         ED BEDSIDE ULTRASOUND:   Performed by ED Physician - none    LABS:  Labs Reviewed   CBC WITH AUTO DIFFERENTIAL   COMPREHENSIVE METABOLIC PANEL W/ REFLEX TO MG FOR LOW K   LIPASE   URINE RT REFLEX TO CULTURE   POCT GLUCOSE    Narrative:     Performed at:  [address] [address]   Phone [phone]       All other labs were within normal range or not returned as of this dictation. EMERGENCY DEPARTMENT COURSE and DIFFERENTIAL DIAGNOSIS/MDM:   Vitals:    Vitals:    04/23/19 1524   BP: (!) 167/56   Pulse: 54   Resp: 18   Temp: 97.6 °F (36.4 °C)   TempSrc: Oral   SpO2: 98%   Weight: 134 lb 7.7 oz (61 kg)           MDM  Number of Diagnoses or Management Options  Diagnosis management comments: 40-year-old female presents to the ED for evaluation of a well-child check and possible confusion. Patient states that she thought she heard someone her home and called EMS for help. Patient has no complaints upon arrival to the ED. The patient has had a similar presentation. Attempts are being made to speak with the patient's family to get a better idea of her baseline. In the meantime basic labs rechecked including urine and chest x-ray to evaluate for an acute reason for the patient to be confused. Patient has no EKG changes and a troponin was added as well as well. Differential diagnosis includes ACS urinary tract infection, pneumonia, electrolyte abnormality, dementia, and CVA. REASSESSMENT      On reevaluation the patient is resting comfortably. Patient's son was eventually reached and presented to the ED. He states the patient does have a history of dementia and is currently at her baseline. He states that he recently back to work the patient has been concerned because there is no at home with her. He states that the patient often thinks other people in house with her and. Patient's son has been back to the house and states that there was no signs of some head and treated to the home. Chest x-ray was negative for prior pulmonary disease and urine was not infected. Patient did recently have an aortic valve replacement which could explain EKG changes. As the patient is symptom-free we'll discharge the patient back home with her son. CRITICAL CARE TIME   Total Critical Care time was 0 minutes, excluding separatelyreportable procedures. There was a high probability ofclinically significant/life threatening deterioration in the patient's condition which required my urgent intervention. CONSULTS:  None    PROCEDURES:  Unless otherwise noted below, none     Procedures    FINAL IMPRESSION      1. Dementia without behavioral disturbance, unspecified dementia type          DISPOSITION/PLAN   DISPOSITION        PATIENT REFERREDTO:  No follow-up provider specified.     DISCHARGEMEDICATIONS:  New Prescriptions    No medications on file          (Please note that portions of this note were completed with a voice recognition program.  Efforts were made to edit the dictations but occasionally words are mis-transcribed.)    Sumi Najera MD (electronically signed)  Attending Emergency Physician          Bill Alamo MD  04/23/19 5175

## 2019-04-23 NOTE — ED NOTES
D/C instructions given to son at this time. Recommended follow-up with pt's PCP for assistance at home. Son states that he's tried contacting everyone he can to get some help caring for pt at home and no one has gotten back to him. Social work consult from here offered to son. Son states that he's already spoken with them several times.       Quique Michael RN  04/23/19 1930

## 2019-04-23 NOTE — ED NOTES
Report received from [facility]. This nurse to take over care of pt.       Jimmie Garza RN  04/23/19 4441

## 2019-04-23 NOTE — ED NOTES
Pt presents to ED via EMS for altered mental status. Pt reports that she pressed her alert button because she thought there were people in her house. EMS states when they arrived, noone was in the house but the patient and that patient was not answering questions appropriately. Pt has hx of dementia and is oriented to person and place, disoriented to time. VSS. NAD noted, skin warm and dry, respirations even and easy. Fall risk screening completed. Fall risk bracelet applied to patient. Non-skid socks provided and placed on patient. The fall risk is indicated using  dome light . Based on score, a bed alarm was indicated and applied. The call light is within the patient's reach, and instructions for use were provided. The bed is in the lowest position with wheels locked. The patient has been advised to notify staff, using the call light, if there is a need to get up or use restroom. The patient verbalized understanding of safety precautions and how to contact staff for assistance. Attempt to call pt's son, Tonie Juarez, no answer.         Lance Branch RN  04/23/19 9306

## 2019-04-23 NOTE — ED NOTES
Pt's son arrived. Dr. Latisha Marr in room to speak with him.       Christoph Alba RN  04/23/19 1918

## 2019-04-24 LAB
EKG ATRIAL RATE: 50 BPM
EKG DIAGNOSIS: NORMAL
EKG P AXIS: 79 DEGREES
EKG P-R INTERVAL: 224 MS
EKG Q-T INTERVAL: 452 MS
EKG QRS DURATION: 122 MS
EKG QTC CALCULATION (BAZETT): 412 MS
EKG R AXIS: 30 DEGREES
EKG T AXIS: -28 DEGREES
EKG VENTRICULAR RATE: 50 BPM
URINE CULTURE, ROUTINE: NORMAL

## 2019-04-24 PROCEDURE — 93010 ELECTROCARDIOGRAM REPORT: CPT | Performed by: INTERNAL MEDICINE

## 2019-04-24 NOTE — RESULT ENCOUNTER NOTE
Culture result reviewed, no further treatment needed. Final urine culture result is negative. No treatment is indicated.

## 2019-04-30 ENCOUNTER — HOSPITAL ENCOUNTER (OUTPATIENT)
Age: 84
Setting detail: OBSERVATION
Discharge: HOME | End: 2019-05-02
Attending: INTERNAL MEDICINE | Admitting: INTERNAL MEDICINE
Payer: MEDICARE

## 2019-04-30 ENCOUNTER — APPOINTMENT (OUTPATIENT)
Dept: GENERAL RADIOLOGY | Age: 84
End: 2019-04-30
Payer: MEDICARE

## 2019-04-30 ENCOUNTER — APPOINTMENT (OUTPATIENT)
Dept: CT IMAGING | Age: 84
End: 2019-04-30
Payer: MEDICARE
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
## 2019-04-30 DIAGNOSIS — R29.6 FREQUENT FALLS: ICD-10-CM

## 2019-04-30 DIAGNOSIS — F03.90 DEMENTIA WITHOUT BEHAVIORAL DISTURBANCE, UNSPECIFIED DEMENTIA TYPE: ICD-10-CM

## 2019-04-30 PROBLEM — S32.2XXA FRACTURE, SACRUM/COCCYX, CLOSED, INITIAL ENCOUNTER (HCC): Status: ACTIVE | Noted: 2019-04-30

## 2019-04-30 PROBLEM — S32.10XA FRACTURE, SACRUM/COCCYX, CLOSED, INITIAL ENCOUNTER (HCC): Status: ACTIVE | Noted: 2019-04-30

## 2019-04-30 LAB
A/G RATIO: 1 (ref 1.1–2.2)
ALBUMIN SERPL-MCNC: 3.6 G/DL (ref 3.4–5)
ALP BLD-CCNC: 80 U/L (ref 40–129)
ALT SERPL-CCNC: 12 U/L (ref 10–40)
ANION GAP SERPL CALCULATED.3IONS-SCNC: 12 MMOL/L (ref 3–16)
AST SERPL-CCNC: 19 U/L (ref 15–37)
BASOPHILS ABSOLUTE: 0 K/UL (ref 0–0.2)
BASOPHILS RELATIVE PERCENT: 0.2 %
BILIRUB SERPL-MCNC: <0.2 MG/DL (ref 0–1)
BILIRUBIN URINE: NEGATIVE
BLOOD, URINE: NEGATIVE
BUN BLDV-MCNC: 23 MG/DL (ref 7–20)
CALCIUM SERPL-MCNC: 9 MG/DL (ref 8.3–10.6)
CHLORIDE BLD-SCNC: 105 MMOL/L (ref 99–110)
CLARITY: CLEAR
CO2: 26 MMOL/L (ref 21–32)
COLOR: YELLOW
CREAT SERPL-MCNC: 0.7 MG/DL (ref 0.6–1.2)
EOSINOPHILS ABSOLUTE: 0 K/UL (ref 0–0.6)
EOSINOPHILS RELATIVE PERCENT: 1.4 %
GFR AFRICAN AMERICAN: >60
GFR NON-AFRICAN AMERICAN: >60
GLOBULIN: 3.6 G/DL
GLUCOSE BLD-MCNC: 126 MG/DL (ref 70–99)
GLUCOSE URINE: NEGATIVE MG/DL
HCT VFR BLD CALC: 32.4 % (ref 36–48)
HEMATOLOGY PATH CONSULT: YES
HEMOGLOBIN: 11 G/DL (ref 12–16)
KETONES, URINE: NEGATIVE MG/DL
LEUKOCYTE ESTERASE, URINE: NEGATIVE
LYMPHOCYTES ABSOLUTE: 0.6 K/UL (ref 1–5.1)
LYMPHOCYTES RELATIVE PERCENT: 35.1 %
MCH RBC QN AUTO: 29 PG (ref 26–34)
MCHC RBC AUTO-ENTMCNC: 34.1 G/DL (ref 31–36)
MCV RBC AUTO: 85.2 FL (ref 80–100)
MICROSCOPIC EXAMINATION: NORMAL
MONOCYTES ABSOLUTE: 0.1 K/UL (ref 0–1.3)
MONOCYTES RELATIVE PERCENT: 4.9 %
NEUTROPHILS ABSOLUTE: 1 K/UL (ref 1.7–7.7)
NEUTROPHILS RELATIVE PERCENT: 58.4 %
NITRITE, URINE: NEGATIVE
PDW BLD-RTO: 15.3 % (ref 12.4–15.4)
PH UA: 8 (ref 5–8)
PLATELET # BLD: 108 K/UL (ref 135–450)
PMV BLD AUTO: 7.2 FL (ref 5–10.5)
POTASSIUM SERPL-SCNC: 3.8 MMOL/L (ref 3.5–5.1)
PROTEIN UA: NEGATIVE MG/DL
RBC # BLD: 3.8 M/UL (ref 4–5.2)
SODIUM BLD-SCNC: 143 MMOL/L (ref 136–145)
SPECIFIC GRAVITY UA: 1.01 (ref 1–1.03)
TOTAL PROTEIN: 7.2 G/DL (ref 6.4–8.2)
URINE REFLEX TO CULTURE: NORMAL
URINE TYPE: NORMAL
UROBILINOGEN, URINE: 0.2 E.U./DL
WBC # BLD: 1.7 K/UL (ref 4–11)

## 2019-04-30 PROCEDURE — 72192 CT PELVIS W/O DYE: CPT

## 2019-04-30 PROCEDURE — 36415 COLL VENOUS BLD VENIPUNCTURE: CPT

## 2019-04-30 PROCEDURE — 73560 X-RAY EXAM OF KNEE 1 OR 2: CPT

## 2019-04-30 PROCEDURE — 70450 CT HEAD/BRAIN W/O DYE: CPT

## 2019-04-30 PROCEDURE — 93005 ELECTROCARDIOGRAM TRACING: CPT | Performed by: PHYSICIAN ASSISTANT

## 2019-04-30 PROCEDURE — 80053 COMPREHEN METABOLIC PANEL: CPT

## 2019-04-30 PROCEDURE — 72170 X-RAY EXAM OF PELVIS: CPT

## 2019-04-30 PROCEDURE — 85025 COMPLETE CBC W/AUTO DIFF WBC: CPT

## 2019-04-30 PROCEDURE — 81003 URINALYSIS AUTO W/O SCOPE: CPT

## 2019-04-30 PROCEDURE — 99285 EMERGENCY DEPT VISIT HI MDM: CPT

## 2019-04-30 PROCEDURE — 71046 X-RAY EXAM CHEST 2 VIEWS: CPT

## 2019-04-30 PROCEDURE — G0378 HOSPITAL OBSERVATION PER HR: HCPCS

## 2019-04-30 RX ORDER — ONDANSETRON 2 MG/ML
4 INJECTION INTRAMUSCULAR; INTRAVENOUS EVERY 6 HOURS PRN
Status: DISCONTINUED | OUTPATIENT
Start: 2019-04-30 | End: 2019-05-02 | Stop reason: HOSPADM

## 2019-04-30 RX ORDER — LEVOTHYROXINE SODIUM 0.03 MG/1
25 TABLET ORAL DAILY
Status: DISCONTINUED | OUTPATIENT
Start: 2019-05-01 | End: 2019-05-02 | Stop reason: HOSPADM

## 2019-04-30 RX ORDER — CITALOPRAM 20 MG/1
20 TABLET ORAL DAILY
Status: DISCONTINUED | OUTPATIENT
Start: 2019-05-01 | End: 2019-05-02 | Stop reason: HOSPADM

## 2019-04-30 RX ORDER — SODIUM CHLORIDE 0.9 % (FLUSH) 0.9 %
10 SYRINGE (ML) INJECTION EVERY 12 HOURS SCHEDULED
Status: DISCONTINUED | OUTPATIENT
Start: 2019-04-30 | End: 2019-05-02 | Stop reason: HOSPADM

## 2019-04-30 RX ORDER — ACETAMINOPHEN 325 MG/1
650 TABLET ORAL EVERY 4 HOURS PRN
Status: DISCONTINUED | OUTPATIENT
Start: 2019-04-30 | End: 2019-05-01

## 2019-04-30 RX ORDER — SODIUM CHLORIDE 0.9 % (FLUSH) 0.9 %
10 SYRINGE (ML) INJECTION PRN
Status: DISCONTINUED | OUTPATIENT
Start: 2019-04-30 | End: 2019-05-02 | Stop reason: HOSPADM

## 2019-04-30 RX ORDER — PRIMIDONE 50 MG/1
50 TABLET ORAL 2 TIMES DAILY
Status: DISCONTINUED | OUTPATIENT
Start: 2019-04-30 | End: 2019-05-02 | Stop reason: HOSPADM

## 2019-04-30 RX ORDER — LOSARTAN POTASSIUM 50 MG/1
50 TABLET ORAL 2 TIMES DAILY
Status: DISCONTINUED | OUTPATIENT
Start: 2019-04-30 | End: 2019-05-02 | Stop reason: HOSPADM

## 2019-04-30 RX ORDER — ASPIRIN 81 MG/1
81 TABLET, CHEWABLE ORAL DAILY
Status: DISCONTINUED | OUTPATIENT
Start: 2019-05-01 | End: 2019-05-02 | Stop reason: HOSPADM

## 2019-04-30 RX ORDER — MEMANTINE HYDROCHLORIDE 5 MG/1
10 TABLET ORAL 2 TIMES DAILY
Status: DISCONTINUED | OUTPATIENT
Start: 2019-04-30 | End: 2019-05-02 | Stop reason: HOSPADM

## 2019-04-30 RX ORDER — CALCIUM CARBONATE 500(1250)
500 TABLET ORAL DAILY
Status: DISCONTINUED | OUTPATIENT
Start: 2019-05-01 | End: 2019-05-02 | Stop reason: HOSPADM

## 2019-04-30 ASSESSMENT — ENCOUNTER SYMPTOMS
VOMITING: 0
DIARRHEA: 0
SHORTNESS OF BREATH: 0

## 2019-04-30 ASSESSMENT — PAIN SCALES - GENERAL
PAINLEVEL_OUTOF10: 0

## 2019-04-30 NOTE — ED PROVIDER NOTES
[facility]  eMERGENCY dEPARTMENT eNCOUnter      Pt Name: Santhosh Monsavle  MRN: [id number]  [place] 9/16/1930  Date of evaluation: 4/30/2019  Provider: Eloy Bolivar, [address] [place] [address]       Chief Complaint   Patient presents with    Knee Pain     HISTORY OF PRESENT ILLNESS  (Location/Symptom, Timing/Onset, Context/Setting, Quality, Duration, Modifying Factors, Severity.)   Santhosh Monsalve is a 80 y.o. female who presents to the emergency department via EMS from home after reported fall from bed. Patient pressed her life alert. She was seen in the emergency Department weeks ago after she was hallucinating and pressed her life alert. The patient states she lives at home with her son. Son recently went back to work and pearly patient is at home alone for most of the day. She has history of Alzheimer's dementia. She complains of pain to her right knee and her low \"buttock after the fall from bed. \"  She denies vomiting diarrhea or fevers. EMS reported the patient was living in poor living conditions including being surrounded by Feces. Nursing Notes were reviewed and I agree. REVIEW OF SYSTEMS    (2-9 systems for level 4, 10 or more for level 5)     Review of Systems   Constitutional: Negative for fever. Respiratory: Negative for shortness of breath. Cardiovascular: Negative for chest pain. Gastrointestinal: Negative for diarrhea and vomiting. Musculoskeletal: Positive for arthralgias. Negative for neck pain and neck stiffness. Skin: Negative for wound. Neurological: Negative for numbness. Psychiatric/Behavioral: Positive for confusion. Negative for agitation. Except as noted above the remainder of the review of systems was reviewed and negative.        PAST MEDICAL HISTORY         Diagnosis Date    Abnormal laboratory test result     History of having positive double- stranded DNA &  recently found to be negative with a negitive ANNALEE    Anemia 2014    Follows with Dr. Dolphus Bumpers Aortic stenosis     mod to severe AS per 10/2017 Echo    Arthritis     Cataract     Dementia     Depression     Duodenitis 2012    EGD for PUD    Episcleritis/scleritis     history of    Fracture bone        Multiple bone fractures and compression fractures    HBP (high blood pressure)     Hypothyroidism     Lupus (systemic lupus erythematosus) (HCC)     MRSA (methicillin resistant staph aureus) culture positive 06/29/2018    urine    Neuropathy     Osteoarthritis     PUD (peptic ulcer disease) 2012    gastric/prepyloric ulcers    Thyroid disease     Tremor     right hand    Venous stasis     left ankle wound; treated by Dr. Janet Gee           Procedure Laterality Date   [address]  12/18/2017    Dr. Hipolito Otto Right     CATARACT REMOVAL Bilateral     LEG DEBRIDEMENT Left 10/12/2015    Dr. Arash Salazar       Previous Medications    APIXABAN (ELIQUIS) 2.5 MG TABS TABLET    Take 1 tablet by mouth 2 times daily    ASPIRIN 81 MG TABLET    Take 81 mg by mouth daily    CALCIUM CARBONATE 600 MG TABS TABLET    Take 1 tablet by mouth daily    CITALOPRAM (CELEXA) 20 MG TABLET    TAKE ONE TABLET BY MOUTH DAILY    ELASTIC BANDAGES & SUPPORTS (MEDICAL COMPRESSION STOCKINGS) MISC    1 each by Does not apply route daily as needed (HEMATOMA)    FUROSEMIDE (LASIX) 20 MG TABLET    TAKE ONE TABLET BY MOUTH DAILY    GABAPENTIN (NEURONTIN) 100 MG CAPSULE    TAKE ONE CAPSULE BY MOUTH THREE TIMES A DAY    LEVOTHYROXINE (SYNTHROID) 25 MCG TABLET    TAKE ONE TABLET BY MOUTH DAILY    LOSARTAN (COZAAR) 100 MG TABLET    TAKE ONE HALF TABLET BY MOUTH TWICE A DAY    MEMANTINE (NAMENDA) 10 MG TABLET    TAKE ONE TABLET BY MOUTH TWICE A DAY    MULTIPLE VITAMINS-MINERALS (CENTRUM SILVER 50+WOMEN PO)    Take 1 tablet by mouth daily     NYSTATIN-TRIAMCINOLONE (MYCOLOG II) 123192-3.1 UNIT/GM-% CREAM    Apply topically daily    PRIMIDONE (MYSOLINE) 50 MG TABLET    TAKE ONE TABLET BY MOUTH TWICE A DAY    VITAMIN C (ASCORBIC ACID) 500 MG TABLET    Take 500 mg by mouth daily       ALLERGIES     Codeine; Erythromycin; Vicodin [hydrocodone-acetaminophen]; Epinephrine; Calamine; and Nylon    FAMILY HISTORY           Problem Relation Age of Onset    Cancer Mother     Cancer Father         prostate    Cancer Sister         breast    High Blood Pressure Sister     High Cholesterol Sister     Heart Disease Maternal Grandmother     Cancer Maternal Grandfather     Cancer Paternal Grandmother      Family Status   Relation Name Status    Mother     Deandra Young Father     Deandra Young Sister  Alive    MGM  (Not Specified)    MGF  (Not Specified)    PGM  (Not Specified)        SOCIAL HISTORY      reports that she quit smoking about 51 years ago. She has a 20.00 pack-year smoking history. She has never used smokeless tobacco. She reports that she does not drink alcohol or use drugs. PHYSICAL EXAM    (up to 7 for level 4, 8 or more for level 5)     ED Triage Vitals [19 1658]   BP Temp Temp Source Pulse Resp SpO2 Height Weight   (!) 183/65 97.6 °F (36.4 °C) Oral 52 15 100 % -- 128 lb 4.9 oz (58.2 kg)     Physical Exam   Constitutional: She appears well-developed and well-nourished. No distress. HENT:   Head: Normocephalic and atraumatic. Eyes: Pupils are equal, round, and reactive to light. Neck: Normal range of motion. Cardiovascular: Regular rhythm and normal heart sounds. Bradycardia present. Pulmonary/Chest: Effort normal. No respiratory distress. Abdominal: Soft. There is no tenderness. Musculoskeletal: Normal range of motion. Neurological: She is alert. Psychiatric: Cognition and memory are impaired. Nursing note and vitals reviewed.       DIAGNOSTIC RESULTS     EKG: All EKG's are interpreted by RIZWAN Avelar in the absence of a cardiologist.    EKG interpreted by myself - please refer to attending physician's note for complete EKG interpretation:    Rhythm: sinus rhythm   No evidence of acute ischemia or injury. RADIOLOGY:   Non-plain film images such as CT, Ultrasound and MRI are read by the radiologist. Plain radiographic images are visualized and preliminarily interpreted by RIZWAN Aleman with the below findings:    Reviewed radiologist's dictation. Interpretation per the Radiologist below, if available at the time of this note:    CT PELVIS WO CONTRAST Additional Contrast? None   Final Result   Fracture of the distal sacrum. XR PELVIS (1-2 VIEWS)   Preliminary Result   Osteopenia. No displaced fracture. Given the degree of osteopenia, nondisplaced fractures may be   radiographically occult. If pain or concern for fracture persists, consider   MR imaging. XR KNEE RIGHT (1-2 VIEWS)   Final Result   No acute fracture dislocation identified. Severe tricompartmental   degenerative disease. XR CHEST STANDARD (2 VW)   Final Result   No acute process. Hiatal hernia         CT Head WO Contrast   Final Result   No acute intracranial abnormality. Stable age-related global cerebral volume loss and chronic microvascular   ischemic disease.                LABS:  Labs Reviewed   CBC WITH AUTO DIFFERENTIAL - Abnormal; Notable for the following components:       Result Value    WBC 1.7 (*)     RBC 3.80 (*)     Hemoglobin 11.0 (*)     Hematocrit 32.4 (*)     Platelets 257 (*)     Neutrophils # 1.0 (*)     Lymphocytes # 0.6 (*)     All other components within normal limits    Narrative:     Linda Rodriguez tel. [phone],  Hematology results called to and read back by Bernardo Garcia RN, 04/30/2019  18:01, by Johnny Mendoza  Performed at:  [address] [address]   Phone [phone]   COMPREHENSIVE METABOLIC PANEL - Abnormal; Notable for the following components: Glucose 126 (*)     BUN 23 (*)     Albumin/Globulin Ratio 1.0 (*)     All other components within normal limits    Narrative:     Performed at:  [facility]  [address] [place], [place] [address]   Phone [phone]   URINE RT REFLEX TO CULTURE    Narrative:     Performed at:  [facility]  [address] [place], [place] [address]   Phone [phone]       All other labs were within normal range or not returned as of this dictation. EMERGENCY DEPARTMENT COURSE and DIFFERENTIAL DIAGNOSIS/MDM:   Vitals:    Vitals:    04/30/19 2147 04/30/19 2202 04/30/19 2217 04/30/19 2232   BP: (!) 170/69 (!) 185/67 (!) 179/60 (!) 181/62   Pulse: 55 54 53 53   Resp: 11 14 16 14   Temp:   97.8 °F (36.6 °C)    TempSrc:       SpO2: 98% 99% 98% 98%   Weight:           I spoke with , Kaila Alcantar, who was similar with the patient. She had recommended that the patient was unsafe to be at home alone in the past.  She did contact adult protective services and recommended that the patient be observed as they're unable to make an thing happen tonight but do not feel that the patient is safe at home and the current situation reportedly the patient's son does not come home until after 8 PM the patient is altered and not able to care for herself at home alone. No evidence of urinary tract infection, severe electrolyte abnormality or anemia. She moves all 4 extremities does have some swelling to her right knee but no evidence of fracture. She was in sinus bradycardia but has been in sinus bradycardia and multiple other recent visits during review of her chart. After discussion with the hospitalist she recommended a CT scan of the pelvis. It shows a sacral fracture. I touched base with orthopedic Dr. Zenaida Foster who will follow.     CONSULTS:  IP CONSULT TO SOCIAL WORK  IP CONSULT TO HOSPITALIST  IP CONSULT TO ORTHOPEDIC SURGERY    PROCEDURES:  None    FINAL IMPRESSION

## 2019-04-30 NOTE — CARE COORDINATION
made referral to Adult [facility] regarding patient being left home alone with Dementia. ( [facility]), this has been discussed with the son several times. Son was to make arrangements for Adult Day Program and services with [facility]. No call back from son at this time.     Cammie DENTON,MSW  [phone]

## 2019-04-30 NOTE — ED NOTES
Bed: B-06  Expected date: 4/30/19  Expected time: 4:49 PM  Means of arrival: [facility]  Comments:  Carline Kimball RN  04/30/19 7203

## 2019-04-30 NOTE — ED NOTES
Pt arrived to ED via [facility]. Per report pt fell at home out of bed and pressed her medical alert button for help. Pt is c/o right knee pain and buttock pain     EMS report that pt home was in disarray with cat feces all around house. Per report EMS believes that pt son comes by the house to check on pt but he was not at the home when they arrived. Pt has a history of dementia and hypertension. Will attempt to complete triage when family arrives.       Sterling Cunningham, RN  04/30/19 Rohan Lipscomb 123, RN  04/30/19 7343

## 2019-04-30 NOTE — ED NOTES
Report received from off going [address] Pain assessment completed as documented.       Stephie Martinez RN  04/30/19 2683

## 2019-04-30 NOTE — LETTER
May 1, 2019    [facility]  Consult Billing Form:      DEMOGRAPHICS:                                                                                                              .    Patient Name:  Daniela Eaton  Patient :  1930   Patient SS#:  [id number]    Patient Phone:  [phone] (home)  Alt. Patient Phone:    Patient Address:  [address] [address]    PCP:  Elizabeth England MD  Insurance:  Payor: MEDICARE / Plan: MEDICARE PART A AND B / Product Type: *No Product type* /   Insurance ID Number:    DIAGNOSIS & PROCEDURE:                                                                                            .    Diagnosis:   Sacral fx    Hospital:  [facility]    Provider:  Audra WARD    SCHEDULING INFORMATION:                                                                                         .     Date of Consultation:                              Audra WARD  19     BILLING INFORMATION:                                                                                                    .    Procedure:       CPT Code Modifier

## 2019-04-30 NOTE — ED NOTES
Fall risk precautions in place. Bed in lowest position with wheels locked, non-skid socks on pt, fall risk ID on pt, call light in reach, pt encouraged to call before getting out of bed and for any other needs or complaints.       Monse Raza RN  04/30/19 1950

## 2019-04-30 NOTE — ED NOTES
This RN called Ms. Varma who is a contact in the patient chart. Per Ms Raad Ayala she seen pt a couple of days ago and states the house \"could use some work\". Ms. Raad Ayala conformed that pt lives with Son but Son recently went back to work and works long hours normally arriving home around 2000 in the evening. During the day pt is left home alone to fend for herself. Per Ms. Avani pt has a history of Parkinson and Dementia.       Meka Chavez RN  04/30/19 7256

## 2019-05-01 ENCOUNTER — TELEPHONE (OUTPATIENT)
Dept: CARDIOLOGY CLINIC | Age: 84
End: 2019-05-01

## 2019-05-01 LAB
ANION GAP SERPL CALCULATED.3IONS-SCNC: 8 MMOL/L (ref 3–16)
BASOPHILS ABSOLUTE: 0 K/UL (ref 0–0.2)
BASOPHILS RELATIVE PERCENT: 0.3 %
BUN BLDV-MCNC: 19 MG/DL (ref 7–20)
CALCIUM SERPL-MCNC: 8.4 MG/DL (ref 8.3–10.6)
CHLORIDE BLD-SCNC: 106 MMOL/L (ref 99–110)
CO2: 28 MMOL/L (ref 21–32)
CREAT SERPL-MCNC: 0.8 MG/DL (ref 0.6–1.2)
EOSINOPHILS ABSOLUTE: 0.1 K/UL (ref 0–0.6)
EOSINOPHILS RELATIVE PERCENT: 2.9 %
FOLATE: >20 NG/ML (ref 4.78–24.2)
GFR AFRICAN AMERICAN: >60
GFR NON-AFRICAN AMERICAN: >60
GLUCOSE BLD-MCNC: 89 MG/DL (ref 70–99)
HCT VFR BLD CALC: 30.4 % (ref 36–48)
HEMATOLOGY PATH CONSULT: NO
HEMATOLOGY PATH CONSULT: NORMAL
HEMOGLOBIN: 10.2 G/DL (ref 12–16)
LYMPHOCYTES ABSOLUTE: 0.7 K/UL (ref 1–5.1)
LYMPHOCYTES RELATIVE PERCENT: 38.3 %
MCH RBC QN AUTO: 28.6 PG (ref 26–34)
MCHC RBC AUTO-ENTMCNC: 33.6 G/DL (ref 31–36)
MCV RBC AUTO: 85 FL (ref 80–100)
MONOCYTES ABSOLUTE: 0.1 K/UL (ref 0–1.3)
MONOCYTES RELATIVE PERCENT: 7.5 %
NEUTROPHILS ABSOLUTE: 0.9 K/UL (ref 1.7–7.7)
NEUTROPHILS RELATIVE PERCENT: 51 %
PDW BLD-RTO: 15.5 % (ref 12.4–15.4)
PLATELET # BLD: 103 K/UL (ref 135–450)
PMV BLD AUTO: 7 FL (ref 5–10.5)
POTASSIUM REFLEX MAGNESIUM: 4 MMOL/L (ref 3.5–5.1)
RBC # BLD: 3.57 M/UL (ref 4–5.2)
SODIUM BLD-SCNC: 142 MMOL/L (ref 136–145)
VITAMIN B-12: 1276 PG/ML (ref 211–911)
VITAMIN D 25-HYDROXY: 28.7 NG/ML
WBC # BLD: 1.8 K/UL (ref 4–11)

## 2019-05-01 PROCEDURE — G0378 HOSPITAL OBSERVATION PER HR: HCPCS

## 2019-05-01 PROCEDURE — 99221 1ST HOSP IP/OBS SF/LOW 40: CPT | Performed by: NURSE PRACTITIONER

## 2019-05-01 PROCEDURE — 85025 COMPLETE CBC W/AUTO DIFF WBC: CPT

## 2019-05-01 PROCEDURE — 36415 COLL VENOUS BLD VENIPUNCTURE: CPT

## 2019-05-01 PROCEDURE — 94760 N-INVAS EAR/PLS OXIMETRY 1: CPT

## 2019-05-01 PROCEDURE — 97535 SELF CARE MNGMENT TRAINING: CPT

## 2019-05-01 PROCEDURE — 82607 VITAMIN B-12: CPT

## 2019-05-01 PROCEDURE — 93010 ELECTROCARDIOGRAM REPORT: CPT | Performed by: INTERNAL MEDICINE

## 2019-05-01 PROCEDURE — 82306 VITAMIN D 25 HYDROXY: CPT

## 2019-05-01 PROCEDURE — 97162 PT EVAL MOD COMPLEX 30 MIN: CPT

## 2019-05-01 PROCEDURE — 97530 THERAPEUTIC ACTIVITIES: CPT

## 2019-05-01 PROCEDURE — 97166 OT EVAL MOD COMPLEX 45 MIN: CPT

## 2019-05-01 PROCEDURE — 6370000000 HC RX 637 (ALT 250 FOR IP): Performed by: INTERNAL MEDICINE

## 2019-05-01 PROCEDURE — 80048 BASIC METABOLIC PNL TOTAL CA: CPT

## 2019-05-01 PROCEDURE — 84425 ASSAY OF VITAMIN B-1: CPT

## 2019-05-01 PROCEDURE — 97116 GAIT TRAINING THERAPY: CPT

## 2019-05-01 PROCEDURE — 82746 ASSAY OF FOLIC ACID SERUM: CPT

## 2019-05-01 PROCEDURE — 2580000003 HC RX 258: Performed by: INTERNAL MEDICINE

## 2019-05-01 RX ORDER — HYDRALAZINE HYDROCHLORIDE 20 MG/ML
5 INJECTION INTRAMUSCULAR; INTRAVENOUS EVERY 6 HOURS PRN
Status: DISCONTINUED | OUTPATIENT
Start: 2019-05-01 | End: 2019-05-02 | Stop reason: HOSPADM

## 2019-05-01 RX ORDER — ACETAMINOPHEN 325 MG/1
650 TABLET ORAL EVERY 4 HOURS PRN
Status: DISCONTINUED | OUTPATIENT
Start: 2019-05-01 | End: 2019-05-02 | Stop reason: HOSPADM

## 2019-05-01 RX ADMIN — APIXABAN 2.5 MG: 2.5 TABLET, FILM COATED ORAL at 08:16

## 2019-05-01 RX ADMIN — MEMANTINE HYDROCHLORIDE 10 MG: 5 TABLET, FILM COATED ORAL at 00:02

## 2019-05-01 RX ADMIN — PRIMIDONE 50 MG: 50 TABLET ORAL at 00:02

## 2019-05-01 RX ADMIN — MEMANTINE HYDROCHLORIDE 10 MG: 5 TABLET, FILM COATED ORAL at 20:42

## 2019-05-01 RX ADMIN — CALCIUM 500 MG: 500 TABLET ORAL at 08:16

## 2019-05-01 RX ADMIN — Medication 10 ML: at 08:16

## 2019-05-01 RX ADMIN — Medication 10 ML: at 00:13

## 2019-05-01 RX ADMIN — LEVOTHYROXINE SODIUM 25 MCG: 25 TABLET ORAL at 05:07

## 2019-05-01 RX ADMIN — LOSARTAN POTASSIUM 50 MG: 50 TABLET ORAL at 00:02

## 2019-05-01 RX ADMIN — LOSARTAN POTASSIUM 50 MG: 50 TABLET ORAL at 08:16

## 2019-05-01 RX ADMIN — PRIMIDONE 50 MG: 50 TABLET ORAL at 08:16

## 2019-05-01 RX ADMIN — CITALOPRAM HYDROBROMIDE 20 MG: 20 TABLET ORAL at 08:16

## 2019-05-01 RX ADMIN — Medication 10 ML: at 20:43

## 2019-05-01 RX ADMIN — LOSARTAN POTASSIUM 50 MG: 50 TABLET ORAL at 20:42

## 2019-05-01 RX ADMIN — ASPIRIN 81 MG 81 MG: 81 TABLET ORAL at 08:15

## 2019-05-01 RX ADMIN — PRIMIDONE 50 MG: 50 TABLET ORAL at 20:42

## 2019-05-01 RX ADMIN — MEMANTINE HYDROCHLORIDE 10 MG: 5 TABLET, FILM COATED ORAL at 08:15

## 2019-05-01 SDOH — HEALTH STABILITY: MENTAL HEALTH: HOW MANY STANDARD DRINKS CONTAINING ALCOHOL DO YOU HAVE ON A TYPICAL DAY?: NOT ASKED

## 2019-05-01 ASSESSMENT — PAIN SCALES - GENERAL
PAINLEVEL_OUTOF10: 0

## 2019-05-01 ASSESSMENT — PAIN SCALES - WONG BAKER: WONGBAKER_NUMERICALRESPONSE: 0

## 2019-05-01 NOTE — PLAN OF CARE
Problem: Falls - Risk of:  Goal: Will remain free from falls  Description  Will remain free from falls  5/1/2019 0727 by Fatmata Costa RN  Outcome: Ongoing  Note:   Pt will remain free from falls. Fall risk assessment completed. Fall precautions in place. Bed in lowest position, wheels locked, bed/chair exit alarm in place, call light within reach, and non skid footwear on. Walkway free of clutter. Pt alert and oriented and able to make needs known. Pt educated to use call light when needing to get up, and pt utilizes call light to make needs known. Will continue to monitor. Problem: Falls - Risk of:  Goal: Absence of physical injury  Description  Absence of physical injury  5/1/2019 0727 by Fatmata Costa RN  Outcome: Ongoing  Note:   Pt will remain absent from physical injury on this shift      Problem: Risk for Impaired Skin Integrity  Goal: Tissue integrity - skin and mucous membranes  Description  Structural intactness and normal physiological function of skin and  mucous membranes. 5/1/2019 0727 by Fatmata Costa RN  Outcome: Ongoing  Note:   Skin being assessed during this shift. Anuj protocol in place. Feet being elevated. Encouraging pt to turn every 2 hours. No signs of new skin breakdown. Will continue to monitor and reassess. Problem: Pain:  Goal: Pain level will decrease  Description  Pain level will decrease  5/1/2019 0727 by Fatmata Costa RN  Outcome: Ongoing  Note:   Assessing and monitoring pt's pain. PRN pain medication being given if ordered. Educating pt on nonpharmacologic interventions for pain control. Will continue to monitor and reassess. Problem: Pain:  Goal: Control of acute pain  Description  Control of acute pain  Outcome: Ongoing  Note:   Assessing and monitoring pt's pain. PRN pain medication being given if ordered. Educating pt on nonpharmacologic interventions for pain control. Will continue to monitor and reassess.        Problem: Pain:  Goal: Control of chronic pain  Description  Control of chronic pain  Outcome: Ongoing  Note:   Assessing and monitoring pt's pain. PRN pain medication being given if ordered. Educating pt on nonpharmacologic interventions for pain control. Will continue to monitor and reassess.

## 2019-05-01 NOTE — PROGRESS NOTES
While doing admission, pt not a great historian about medical hx, surg hx, or home medications. Pt easily loses concentration after beginning to answer question. Will continue to monitor.   Electronically signed by Geronimo Mcclain RN on 5/1/2019 at 2:11 AM

## 2019-05-01 NOTE — CARE COORDINATION
Referral per MD for d/c planning. See ED SW notes. Called son again and left another message; awaiting return call.    Electronically signed by Victor Manuel Mireles on 5/1/2019 at 11:14 AM   #[id number]

## 2019-05-01 NOTE — PROGRESS NOTES
Pharmacy Medication Reconciliation Note     List of medications patient is currently taking is complete. Source of information:   1. Fill records from [facility]  2. EMR  3. Call to [place] [address]  4. OARRS report    Allergies   Allergen Reactions    Codeine Nausea Only    Erythromycin Nausea Only    Vicodin [Hydrocodone-Acetaminophen] Nausea Only    Epinephrine Other (See Comments)     hypotension    Calamine Rash     Blisters    Nylon Rash       Notes regarding home medications:   1. Patient unsure of medication regimen. Attempted to call her son Masoud Etienne, but did not receive a call back. Completed list using fill records from [address] [place] [address] (patient states she only uses [facility] for prescriptions). 2. Removed gabapentin from home med list.  Has not been filled since 2/19 and refused refill request from Dr. Xuan Larson noted in 2/19. 3. Spoke with Gabe Velasquez at [place] [address] regarding Eliquis. Patient received samples in 10/18 from Dr. Butler Six office, but it does not appear she has taken since. Gabe Velasquez clarified with NORAH Mancini RN at [facility] and she stated that patient does not need to be on Eliquis at this time. Obtained order from Dr. Gaby Magallanes to D/C Eliquis.     Mike Arellano, PharmD, BCPS  5/1/2019  2:47 PM

## 2019-05-01 NOTE — ED NOTES
Report called to KYLE Lamar, all questions answered. Pt continues to deny any pain at this time.        Nila Alpers, RN  04/30/19 [id number]

## 2019-05-01 NOTE — PROGRESS NOTES
Occupational Therapy   Occupational Therapy Initial Assessment  Date: 2019   Patient Name: Luis Watkins  MRN: [id number]     : 1930    Date of Service: 2019    Assessment: Pt is 80 y.o. F who presents after using Life alert button s/p fall resulting in sacrum/coccyx fracture. Pt with history of dementia and parkinson's, found at home which was in disarray. PTA pt lives with son who works long hours during the day and pt is home alone. Pt has aide that comes 2x/week and assists with shower. Pt reports independence in dressing and toileting, assistance for homemaking responsibilities. Currently, pt presents with ROM/strength [facility] for self-care and transfers however general weakness noted. Pt with decreased cognition however follows cues and is partially oriented. Pt required max A for toileting this date. Pt completed bed mobility with SBA and CGA for sit <> stand transfers. Pt completed functional mobility with RW with CGA and cues for RW navigation. Pt is unsafe to return home and would benefit from strict 24/7 supervision/assist. If this level of assistance is not available pt would benefit from continued skilled therapy and would benefit from transition to LTC. Discharge Recommendations:  Continue to assess pending progress, Patient would benefit from continued therapy after discharge, 3-5 sessions per week       Assessment   Performance deficits / Impairments: Decreased functional mobility ; Decreased strength;Decreased endurance;Decreased ADL status; Decreased safe awareness;Decreased balance;Decreased cognition  Assessment: Pt is 80 y.o. F who presents after using Life alert button s/p fall resulting in sacrum/coccyx fracture. Pt with history of dementia and parkinson's, found at home which was in disarray. PTA pt lives with son who works long hours during the day and pt is home alone. Pt has aide that comes 2x/week and assists with shower.  Pt reports independence in dressing and toileting, assistance for homemaking responsibilities. Currently, pt presents with ROM/strength [facility] for self-care and transfers however general weakness noted. Pt with decreased cognition however follows cues and is partially oriented. Pt required max A for toileting this date. Pt completed bed mobility with SBA and CGA for sit <> stand transfers. Pt completed functional mobility with RW with CGA and cues for RW navigation. Pt is unsafe to return home and would benefit from strict 24/7 supervision/assist. If this level of assistance is not available pt would benefit from continued skilled therapy and would benefit from transition to LTC. Treatment Diagnosis: impaired func mob, transfers, and ADL status   Prognosis: Fair  Decision Making: Medium Complexity  History: PMH: Lupus, dementia, parkinsons  Exam: ADLs, transfers, func mob, bed mob  Assistance / Modification: CGA for mobility, up to max A for ADLs  Patient Education: Role of OT, POC, safety   REQUIRES OT FOLLOW UP: Yes  Activity Tolerance  Activity Tolerance: Treatment limited secondary to decreased cognition  Safety Devices  Safety Devices in place: Yes(KYLE Quiroz) notified)  Type of devices: Left in chair;Gait belt;Call light within reach; Chair alarm in place;Nurse notified           Patient Diagnosis(es): The primary encounter diagnosis was Fall from bed, initial encounter. Diagnoses of Frequent falls and Dementia without behavioral disturbance, unspecified dementia type were also pertinent to this visit. has a past medical history of Abnormal laboratory test result, Anemia, Anxiety, Aortic stenosis, Arthritis, Cataract, Dementia, Depression, Duodenitis, Episcleritis/scleritis, Fracture bone, HBP (high blood pressure), Hypothyroidism, Lupus (systemic lupus erythematosus) (HCC), MRSA (methicillin resistant staph aureus) culture positive, Neuropathy, Osteoarthritis, PUD (peptic ulcer disease), Thyroid disease, Tremor, and Venous stasis.    has a past surgical history that includes Cataract removal (Bilateral); Total knee arthroplasty (Left); Carpal tunnel release (Right); Cardiac catheterization (12/18/2017); and Leg Debridement (Left, 10/12/2015). Treatment Diagnosis: impaired func mob, transfers, and ADL status       Restrictions  Restrictions/Precautions  Restrictions/Precautions: Fall Risk    Subjective   General  Chart Reviewed: Yes  Patient assessed for rehabilitation services?: Yes  Additional Pertinent Hx: RIZWAN Valle 4/30/19: Pt is \"68 y.o. female who presents to the emergency department via EMS from home after reported fall from bed. Patient pressed her life alert. She was seen in the emergency Department weeks ago after she was hallucinating and pressed her life alert. The patient states she lives at home with her son. Son recently went back to work and pearly patient is at home alone for most of the day. She has history of Alzheimer's dementia. She complains of pain to her right knee and her low \"buttock after the fall from bed. \"  She denies vomiting diarrhea or fevers. EMS reported the patient was living in poor living conditions including being surrounded by Feces. \"  Family / Caregiver Present: No  Referring Practitioner: Mat Arana MD  Diagnosis: Sacrum/coccyx closed fracture   Subjective  Subjective: Pt met bedside, agreeable for therapy evaluation and OOB activity.    Pain Assessment  Pain Assessment: 0-10  Pain Level: 0  Oxygen Therapy  SpO2: 99 %  O2 Device: None (Room air)     Social/Functional History  Social/Functional History  Lives With: Son  Type of Home: House  Home Layout: Two level, Laundry in basement  Home Access: Stairs to enter with rails  Entrance Stairs - Number of Steps: 10-12 YANY   Entrance Stairs - Rails: Both  Bathroom Shower/Tub: Tub/Shower unit(Pt sponge bathes )  Bathroom Toilet: Handicap height  Bathroom Accessibility: Accessible  Home Equipment: Rolling walker, Alert [employer] Help From: Family, Home health  ADL Assistance: Needs assistance  Homemaking Assistance: (Son and aid assist)  Ambulation Assistance: Independent(with RW)  Transfer Assistance: Independent  Additional Comments: Pt requires assist with bathing via home aid, IADLs, and house maintenance. Above info is from previous hospitalization - pt poor historian. Objective        Orientation  Overall Orientation Status: Impaired  Orientation Level: Oriented to person;Oriented to place; Disoriented to time(partially oriented to situation)     Balance  Sitting Balance: Stand by assistance  Standing Balance: Contact guard assistance  Standing Balance  Time: ~3 minutes  Activity: Func mob, transfers, ADL tasks    Functional Mobility  Functional - Mobility Device: Rolling Walker  Activity: To/from bathroom  Assist Level: Contact guard assistance  Functional Mobility Comments: Use of abdulaziz stedy to the bathroom - pt completed with no difficulty. Trialed RW from bathroom to recliner chair. Pt completed functional mobility with RW with CGA and cues to navigate RW. Toilet Transfers  Toilet - Technique: Ambulating  Equipment Used: Grab bars(Comfort height commode)  Toilet Transfer: Contact guard assistance  Toilet Transfers Comments: CGA for sit <> stand from commode with use of grab bar     ADL  Feeding: Setup  Toileting: (Assist for clothing management and pericare )  Additional Comments: PTA pt has assistance for showering from aide, independence in toileting and dressing - unsure of accuracy and thoroughness. Anticipate pt to require min/mod A for bathing/dressing/toileting.       Tone RUE  RUE Tone: Normotonic  Tone LUE  LUE Tone: Normotonic  Coordination  Movements Are Fluid And Coordinated: Yes     Bed mobility  Supine to Sit: Stand by assistance  Sit to Supine: Unable to assess     Transfers  Sit to stand: Contact guard assistance  Stand to sit: Contact guard assistance  Transfer Comments: CGA for sit <> stand from EOB to abdulaziz stedy, sit to recliner chair with verbal cues for safe hand placement      Cognition  Overall Cognitive Status: Exceptions  Arousal/Alertness: Delayed responses to stimuli  Memory: Decreased recall of recent events;Decreased short term memory  Safety Judgement: Decreased awareness of need for assistance;Decreased awareness of need for safety  Problem Solving: Assistance required to generate solutions;Assistance required to implement solutions  Insights: Decreased awareness of deficits  Initiation: Requires cues for some  Sequencing: Requires cues for some        Sensation  Overall Sensation Status: WFL        LUE AROM (degrees)  LUE AROM : WFL  Left Hand AROM (degrees)  Left Hand AROM: WFL  RUE AROM (degrees)  RUE AROM : WFL  Right Hand AROM (degrees)  Right Hand AROM: WFL     LUE Strength  LUE Strength Comment: General weakness noted  RUE Strength  RUE Strength Comment: General weakness noted          Plan   Plan  Times per week: 3-5  Times per day: Daily  Current Treatment Recommendations: Strengthening, Functional Mobility Training, Endurance Training, Balance Training, Safety Education & Training, Equipment Evaluation, Education, & procurement, Patient/Caregiver Education & Training    AM-[facility] Score    Luis Watkins scored a 15/24 on the [facility] ADL Inpatient form. Current research shows that an -PAC score of 17 or less is typically not associated with a discharge to the patient's home setting. Based on the patients AM-PAC score and their current ADL deficits, it is recommended that the patient have 3-5 sessions per week of Occupational Therapy at d/c to increase the patients independence.      [facility] Inpatient Daily Activity Raw Score: 15  AM-PAC Inpatient ADL T-Scale Score : 34.69  ADL Inpatient CMS 0-100% Score: 56.46  ADL Inpatient CMS G-Code Modifier : CK    Goals  Short term goals  Time Frame for Short term goals: prior to d/c  Short term goal 1: Pt will complete functional mobility and transfers with SBA  Short term goal 2: Pt will complete bathing with SBA  Short term goal 3: Pt will complete dressing with SBA  Short term goal 4: Pt will complete toileting with SBA  Short term goal 5: Pt will complete grooming with setup  Patient Goals   Patient goals : Pt did not formulate appropriate goal at this time d/t cognition. Anticipate hopeful to return home. Therapy Time   Individual Concurrent Group Co-treatment   Time In       0810   Time Out       0850   Minutes       40   Timed Code Treatment Minutes: 25 Minutes(15 min eval )     If pt is discharged prior to next OT session, this note will serve as the discharge summary.     Dori Plascencia

## 2019-05-01 NOTE — PROGRESS NOTES
Pt arrived to floor from ER at 2323 via stretcher. Pt oriented to room, call light, policies and procedures, the menu and ordering. Call light within reach. Bed in lowest position, bed alarm on, and wheels locked. Pt verbalized understanding. No complaints, questions or concerns at this time.   Electronically signed by Delmy Taylor RN on 5/1/2019 at 2:08 AM

## 2019-05-01 NOTE — PROGRESS NOTES
4 Eyes Skin Assessment     The patient is being assess for  Admission    I agree that 2 RN's have performed a thorough Head to Toe Skin Assessment on the patient. ALL assessment sites listed below have been assessed. Areas assessed by both nurses:   [x]   Head, Face, and Ears   [x]   Shoulders, Back, and Chest  [x]   Arms, Elbows, and Hands   [x]   Coccyx, Sacrum, and IschIum  [x]   Legs, Feet, and Heels        Does the Patient have Skin Breakdown?   No         Anuj Prevention initiated:  Yes   Wound Care Orders initiated:  No      [facility] nurse consulted for Pressure Injury (Stage 3,4, Unstageable, DTI, NWPT, and Complex wounds), New and Established Ostomies:  NA      Nurse 1 eSignature: Electronically signed by Dieter Fox RN on 5/1/19 at 2:09 AM    **SHARE this note so that the co-signing nurse is able to place an eSignature**    Nurse 2 eSignature: Electronically signed by Paula Muir RN on 5/1/19 at 2:09 AM

## 2019-05-01 NOTE — TELEPHONE ENCOUNTER
Oneida Vega RN at [facility] Cardiology, called regarding pts Eliquis. Pt is currently an IP at [facility]. Pt on Eliquis post TAVR for increased gradient. Gradient on echo 6/12/18 had decreased to 13. Was to be on for 6 months starting 5/2018. Pt canceled appointment on 4/4/19 for echo and OV. Discussed with Dr. David Davila. OK to stop Eliquis. Pt has echo and OV w Dr. David Davila scheduled for 5/30/19. Oneida Vega RN aware.  Carl WRIGHT

## 2019-05-01 NOTE — PROGRESS NOTES
Pt is resting in bed. Call light within reach. Fall precautions are in place with the bed alarm on. Pt has been calling out appropriately. Ambulating with a contact guard assist and a walker, tolerating well. Will continue to monitor and reassess.   Electronically signed by Carlitos Germain RN on 5/1/2019 at 6:25 PM

## 2019-05-01 NOTE — ED PROVIDER NOTES
EKG Interpretation    Interpreted by emergency department physician  Time read: 4986    Rhythm: Sinus  Ventricular Rate: 49  QRS Axis: 37  Ectopy: None  Conduction: Nonspecific interventricular conduction delay that appears to be a left bundle branch block but does not have a leftward axis, first-degree AV block  ST Segments: normal  T Waves: Nonspecific changes with biphasic T waves in V5 and V6  Q Waves: None    Compared to EKG on: 12/13/18    Clinical Impression: Sinus bradycardia with first-degree AV block and nonspecific interventricular conduction delay. There is a lateral T wave abnormality with biphasic T waves bilaterally. Biphasic T waves appear changed from her previous EKG on 12/13/18    [address]      Pt Name: Sanjay Buck  MRN: [id number]  [place] 9/16/1930  Date of evaluation: 4/30/2019  Provider: Turner Martinez DO    This patient was evaluated and dispositioned by the physician assistant. They were not personally evaluated by me. However, I was available in the emergency department for immediate consultation. I did review testing that was performed on the patient. I agree with their disposition and treatment.       LABORATORY  Labs Reviewed   CBC WITH AUTO DIFFERENTIAL - Abnormal; Notable for the following components:       Result Value    WBC 1.7 (*)     RBC 3.80 (*)     Hemoglobin 11.0 (*)     Hematocrit 32.4 (*)     Platelets 737 (*)     Neutrophils # 1.0 (*)     Lymphocytes # 0.6 (*)     All other components within normal limits    Narrative:     Elias San tel. [phone],  Call cancelled/not needed, 05/01/2019 07:35, by Jay Lama tel. [phone],  Hematology results called to and read back by Aakash Rivas RN, 04/30/2019  18:01, by Colette Alfaro  Performed at:  [place] [address]   Phone [phone] METABOLIC PANEL - Abnormal; Notable for the following components:    Glucose 126 (*)     BUN 23 (*)     Albumin/Globulin Ratio 1.0 (*)     All other components within normal limits    Narrative:     Performed at:  [address] [address]   Phone [phone]   CBC WITH AUTO DIFFERENTIAL - Abnormal; Notable for the following components:    WBC 1.8 (*)     RBC 3.57 (*)     Hemoglobin 10.2 (*)     Hematocrit 30.4 (*)     RDW 15.5 (*)     Platelets 822 (*)     Neutrophils # 0.9 (*)     Lymphocytes # 0.7 (*)     All other components within normal limits    Narrative:     Bj Rodriguez  [facility] tel. [phone],  Call cancelled/not needed, 05/01/2019 07:36, by Walt Miller.Scott tel. [phone],  Hematology results called to and read back by Jarret Buenrostro,  05/01/2019 07:16, by Sotero Beard  Performed at:  [address] [address]   Phone [phone]   VITAMIN B12 & FOLATE - Abnormal; Notable for the following components:    Vitamin B-12 1276 (*)     All other components within normal limits    Narrative:     Performed at:  [address] [address]   Phone [phone]   VITAMIN D 25 HYDROXY - Abnormal; Notable for the following components:    Vit D, 25-Hydroxy 28.7 (*)     All other components within normal limits    Narrative:     Performed at:  [address] [address]   Phone [phone]   CBC WITH AUTO DIFFERENTIAL - Abnormal; Notable for the following components:    WBC 1.8 (*)     Hemoglobin 11.6 (*)     RDW 15.7 (*)     Neutrophils # 1.2 (*)     Lymphocytes # 0.6 (*)     All other components within normal limits    Narrative:     CALL  [place]  [facility]. [phone],  Previous panic on this admission - call not needed per SOP, 05/02/2019 09:34,  by KNEAS  Performed at:  [facility]  [address] [facility] [place] [address]   Phone [phone]   URINALYSIS - Abnormal; Notable for the following components:    Clarity, UA CLOUDY (*)     Bilirubin Urine SMALL (*)     Leukocyte Esterase, Urine TRACE (*)     All other components within normal limits    Narrative:     Performed at:  [facility]  [address] [facility] [place] [address]   Phone [phone]   URINE RT REFLEX TO CULTURE    Narrative:     Performed at:  [facility]  [address] [facility] [place] [address]   Phone [phone]   BASIC METABOLIC PANEL W/ REFLEX TO MG FOR LOW K    Narrative:     Performed at:  [facility]  [address] [facility] [place] [address]   Phone [phone]   PERIPHERAL BLOOD Adrian Carranza REVIEW    Narrative:     Juan Whitaker [phone],  Call cancelled/not needed, 05/01/2019 07:35, by [facility]  Performed at:  [facility]  [address] [place] [address]   Phone [phone]   TSH WITHOUT REFLEX    Narrative:     Performed at:  [address] [facility]  [address] [place] [address]   Phone [phone]   T4, FREE    Narrative:     Performed at:  [address] [place] [address]  [address], [place] [address]   Phone [phone]   MICROSCOPIC URINALYSIS    Narrative:     Performed at:  [facility]  [address], [place] [address]   Phone [phone]   VITAMIN B1       RADIOLOGY/PROCEDURES  I personally reviewed the images for this case. CT PELVIS WO CONTRAST Additional Contrast? None   Final Result   Fracture of the distal sacrum. XR PELVIS (1-2 VIEWS)   Final Result   Osteopenia. No displaced fracture.       Given the degree of osteopenia, nondisplaced fractures may be radiographically occult. If pain or concern for fracture persists, consider   MR imaging. XR KNEE RIGHT (1-2 VIEWS)   Final Result   No acute fracture dislocation identified. Severe tricompartmental   degenerative disease. XR CHEST STANDARD (2 VW)   Final Result   No acute process. Hiatal hernia         CT Head WO Contrast   Final Result   No acute intracranial abnormality. Stable age-related global cerebral volume loss and chronic microvascular   ischemic disease. COURSE & MEDICAL DECISION MAKING  Vitals:    05/02/19 0433 05/02/19 0652 05/02/19 0732 05/02/19 1107   BP: (!) 186/75  (!) 181/73 120/70   Pulse: 60  64 70   Resp: 14  16 18   Temp: 98.1 °F (36.7 °C)  98.1 °F (36.7 °C) 98.3 °F (36.8 °C)   TempSrc: Oral  Oral Oral   SpO2: 99%  97% 95%   Weight:  121 lb 0.5 oz (54.9 kg)     Height:           Medications - No data to display    Discharge Medication List as of 5/2/2019  3:16 PM          IMPRESSION(S):  1. Fall from bed, initial encounter    2. Frequent falls    3.  Dementia without behavioral disturbance, unspecified dementia type                 Arabella Ortiz,   05/03/19 4148

## 2019-05-01 NOTE — CONSULTS
[facility] Orthopedic Surgery  Consult Note    This patient is seen in consultation at the request of Dr Bhargavi Monsalve    Reason for Consult:  Sacral fx    CHIEF COMPLAINT:  Tailbone pain    History Obtained From:  patient, electronic medical record    HISTORY OF PRESENT ILLNESS:    The patient is a 80 y.o. female who presents with tailbone pain. She states she fell at home yesterday. She pressed med alert button and EMS found in her stool in poor living conditions. ER SW has addressed APS with son in past.  . Pain is described in tailbone and with the intensity of mild. Pain is described as aching. Discomfort is intermittent Pain is worse with standing . Better if still NO pain while seated in chair at this time is reported. No other complaints She is alert and oriented to current situation and has answered questions appropriately. Asked to go to [facility].      Past Medical History:        Diagnosis Date    Abnormal laboratory test result     History of having positive double- stranded DNA &  recently found to be negative with a negitive ANNALEE    Anemia 2014    Follows with Dr. Lanre Deleon Aortic stenosis     mod to severe AS per 10/2017 Echo    Arthritis     Cataract     Dementia     Depression     Duodenitis 2012    EGD for PUD    Episcleritis/scleritis     history of    Fracture bone        Multiple bone fractures and compression fractures    HBP (high blood pressure)     Hypothyroidism     Lupus (systemic lupus erythematosus) (HCC)     MRSA (methicillin resistant staph aureus) culture positive 06/29/2018    urine    Neuropathy     Osteoarthritis     PUD (peptic ulcer disease) 2012    gastric/prepyloric ulcers    Thyroid disease     Tremor     right hand    Venous stasis     left ankle wound; treated by Dr. Amadou Campbell       Past Surgical History:        Procedure Laterality Date    CARDIAC CATHETERIZATION  12/18/2017    Dr. Crow Field Right     CATARACT REMOVAL Bilateral     LEG DEBRIDEMENT Left 10/12/2015    Dr. Arnold Monzon Left        Social History     Tobacco Use    Smoking status: Former Smoker     Packs/day: 1.00     Years: 20.00     Pack years: 20.00     Last attempt to quit: 1967     Years since quittin.8    Smokeless tobacco: Never Used   Substance Use Topics    Alcohol use: No       Family History   Problem Relation Age of Onset    Cancer Mother     Cancer Father         prostate    Cancer Sister         breast    High Blood Pressure Sister     High Cholesterol Sister     Heart Disease Maternal Grandmother     Cancer Maternal Grandfather     Cancer Paternal Grandmother            Current Medications:   Current Facility-Administered Medications: hydrALAZINE (APRESOLINE) injection 5 mg, 5 mg, Intravenous, Q6H PRN  acetaminophen (TYLENOL) tablet 650 mg, 650 mg, Oral, Q4H PRN  aspirin chewable tablet 81 mg, 81 mg, Oral, Daily  apixaban (ELIQUIS) tablet 2.5 mg, 2.5 mg, Oral, BID  calcium elemental (OSCAL) tablet 500 mg, 500 mg, Oral, Daily  citalopram (CELEXA) tablet 20 mg, 20 mg, Oral, Daily  levothyroxine (SYNTHROID) tablet 25 mcg, 25 mcg, Oral, Daily  losartan (COZAAR) tablet 50 mg, 50 mg, Oral, BID  memantine (NAMENDA) tablet 10 mg, 10 mg, Oral, BID  primidone (MYSOLINE) tablet 50 mg, 50 mg, Oral, BID  sodium chloride flush 0.9 % injection 10 mL, 10 mL, Intravenous, 2 times per day  sodium chloride flush 0.9 % injection 10 mL, 10 mL, Intravenous, PRN  magnesium hydroxide (MILK OF MAGNESIA) 400 MG/5ML suspension 30 mL, 30 mL, Oral, Daily PRN  ondansetron (ZOFRAN) injection 4 mg, 4 mg, Intravenous, Q6H PRN  Allergies:  Codeine; Erythromycin; Vicodin [hydrocodone-acetaminophen]; Epinephrine; Calamine; and Nylon    REVIEW OF SYSTEMS:    CONSTITUTIONAL:  negative for  fevers, chills and malaise  MUSCULOSKELETAL:  positive for  myalgias, arthralgias and pain  All other ROS reviewed in chart or with patient or family and are grossly negative. Diffuse osteopenia limits evaluation.       Degenerative changes within the lower lumbar spine.  Fracture of the distal   sacrum best seen on sagittal imaging.  No widening of the sacroiliac joints. No hip dislocation.  Moderate bilateral hip joint space narrowing.       Small umbilical hernia contains fat.  Large stool volume.  No evidence of   bowel obstruction.  Trace free fluid within the pelvis.  No bladder calculus. No CT evidence of soft tissue injury.           Impression   Fracture of the distal sacrum.               Narrative   EXAMINATION:   2 XRAY VIEWS OF THE RIGHT KNEE       4/30/2019 3:08 pm       COMPARISON:   None.       HISTORY:   ORDERING SYSTEM PROVIDED HISTORY: Trauma, R/O fx   TECHNOLOGIST PROVIDED HISTORY:   Reason for exam:->Trauma, R/O fx   Ordering Physician Provided Reason for Exam: right knee pain   Acuity: Acute   Type of Exam: Initial   Mechanism of Injury: fall       FINDINGS:   Bones are very demineralized.  There are severe degenerative changes seen   within all 3 compartments, with complete loss of joint space within the   medial and lateral compartments.  Multiple articular bodies noted.  No soft   tissue abnormalities are detected.  No joint effusion.           Impression   No acute fracture dislocation identified.  Severe tricompartmental   degenerative disease.             IMPRESSION/RECOMMENDATIONS:    ? Fall  Hx dementia  Found in poor living conditions. See SW notes for APS eval  Sacral fx, nonop tx, PT OT to see  Right knee severe OA, WBAT, nontender today  SS to see for DC planning  FU ortho prn concerns or LE weakness or pain.    Discussed with DR Eusebia Esteves  5/1/2019  10:00 AM

## 2019-05-01 NOTE — ED NOTES
Pt's son Vinayak Cornejo called ED and was updated on pt. Son states he is going to call  Monica in the morning. Son gave updated phone number of [phone].        America Daniel RN  04/30/19 2022

## 2019-05-01 NOTE — CARE COORDINATION
Received call from patients son Fadi Schulz. He reports plan is for his mom to return to home with care as prior to admit at this time as he has \"no other choice\". She is currently active with [facility] for home health aide visits twice weekly for help with personal care. She also has [facility]. These are services she gets through he [place] [facility]. Son voiced frustration over NO ONE following through on trying to help him find a solution to his situation with his mom. Reports he sees the [facility]  twice a year and he can only reach her via email. States mom was seen by [facility] and they are due back but he is not expecting follow through based on his past experiences. He attempted to seek legal help which was going to cost him $5000+ so he declined. He thought about day care in the winter but felt the risks outweighed the benefits of getting her out of the house and up /down steps and then taking her to a facility where she would be subject to germs and getting sick herself. He did agree that now that summer months are upcoming he would consider a [place]  situation if this could be worked out. Awaiting call back from the [employer] . Will inform APS of his plans (Karthik Richard @ [phone]).    Electronically signed by Mono Parikh on 5/1/2019 at 3:16 PM   #[phone]

## 2019-05-01 NOTE — PROGRESS NOTES
10/12/2015). Restrictions  Restrictions/Precautions  Restrictions/Precautions: Fall Risk  Vision/Hearing  Vision: (PMH indicates macular degeneration)  Hearing: Within functional limits     Subjective  General  Chart Reviewed: Yes  Patient assessed for rehabilitation services?: Yes  Additional Pertinent Hx: here due to AMS - found down at home  Response To Previous Treatment: Not applicable  Family / Caregiver Present: No  Follows Commands: Within Functional Limits  Subjective  Subjective: alert oriented to self and general situation- pleasant and cooperative for PT OT assessments  Pain Screening  Patient Currently in Pain: Denies  Social/Functional History  Social/Functional History  Lives With: Son  Type of Home: House  Home Layout: Two level, Laundry in basement  Home Access: Stairs to enter with rails  Entrance Stairs - Number of Steps: 10-12 YANY   Entrance Stairs - Rails: Both  Bathroom Shower/Tub: Tub/Shower unit(Pt sponge bathes )  Bathroom Toilet: Handicap height  Bathroom Accessibility: Accessible  Home Equipment: Rolling walker, Alert [employer] Help From: Family, Home health  ADL Assistance: Needs assistance  Homemaking Assistance: (Son and aid assist)  Ambulation Assistance: Independent(with RW)  Transfer Assistance: Independent  Additional Comments: Pt requires assist with bathing via home aid, IADLs, and house maintenance. Above info is from previous hospitalization - pt poor historian.    Objective  ROM and strength grossly functional  Motor Control  Gross Motor?: WFL(minor intention tremor observed while eating)  Sensation  Overall Sensation Status: WFL  Bed mobility  Supine to Sit: Stand by assistance  Sit to Supine: Unable to assess  Transfers  Sit to Stand: Contact guard assistance  Stand to sit: Stand by assistance  Ambulation  Ambulation?: Yes  Ambulation 1  Surface: level tile  Device: Rolling Walker  Assistance: Contact guard assistance  Quality of Gait: step through pattern with

## 2019-05-01 NOTE — PROGRESS NOTES
Hospitalist Progress Note      PCP: Cody Casey MD    Date of Admission: 4/30/2019    Chief Complaint: fall    Patient seen earlier today by one of my colleagues. Briefly, this is an 79 y/o F with hx dementia who p/w mechanical fall and found to have a fracture of the distal sacrum. No intervention per ortho and neurosurgery. Incidentally, patient also found to have pancytopenia. Pt denies fever, chills, chest pain, shortness of breath, abdominal pain, nausea, vomiting, constipation, diarrhea, and dysuria. Denies recent viral illness. Denies any new medications. Subjective: Pt seen and examined. Feels well overall, has no acute complaints. Medications:  Reviewed    Infusion Medications   Scheduled Medications    aspirin  81 mg Oral Daily    calcium elemental  500 mg Oral Daily    citalopram  20 mg Oral Daily    levothyroxine  25 mcg Oral Daily    losartan  50 mg Oral BID    memantine  10 mg Oral BID    primidone  50 mg Oral BID    sodium chloride flush  10 mL Intravenous 2 times per day     PRN Meds: hydrALAZINE, acetaminophen, sodium chloride flush, magnesium hydroxide, ondansetron      Intake/Output Summary (Last 24 hours) at 5/1/2019 1705  Last data filed at 5/1/2019 1027  Gross per 24 hour   Intake 360 ml   Output --   Net 360 ml       Exam:    /62   Pulse 62   Temp 98.2 °F (36.8 °C) (Oral)   Resp 14   Ht 5' 5\" (1.651 m)   Wt 122 lb 9.2 oz (55.6 kg)   SpO2 99%   BMI 20.40 kg/m²     General appearance: No apparent distress, appears stated age and cooperative. HEENT: Irregular sized pupils with marked incisions. Conjunctivae/corneas clear. Neck: Supple, with full range of motion. No jugular venous distention. Trachea midline. Respiratory:  Normal respiratory effort. Clear to auscultation, bilaterally without Rales/Wheezes/Rhonchi. Cardiovascular: Regular rate and rhythm with normal S1/S2 without murmurs, rubs or gallops.   Abdomen: Soft, non-tender, non-distended Stable age-related global cerebral volume loss and chronic microvascular   ischemic disease.                  Assessment/Plan:    Active Hospital Problems    Diagnosis Date Noted    Fracture, sacrum/coccyx, closed, initial encounter ([facility] [address] [S32.10XA, S32.2XXA] 04/30/2019     Sacrum fracture  -PT/OT  -appreciate ortho, neurosurgery input  -likely needs rehab on discharge     Frequent falls  -PT/OT    Pancytopenia - unclear etiology  -trend CBC     Dementia  -social work, likely needs placement, can not care for herself  -continue namenda     HTN  -continue losartan  -PRN hydralazine     Hx TAVR  -per cardiology note dated 4/1/19, continue eliquis for at least 6 months     Hx hypothyroidism  -continue synthroid    DVT Prophylaxis: Lovenox  Diet: DIET GENERAL;  Code Status: Full Code    PT/OT Eval Status: ordered    Dispo - continue care, anticipate discharge tomorrow, would like to monitor Jojo Giang MD

## 2019-05-01 NOTE — H&P
Hospital Medicine History & Physical      PCP: Chula Nguyễn MD    Date of Admission: 4/30/2019    Date of Service: Pt seen/examined on 5/1/19 and  Placed in Observation. Chief Complaint:  fell      History Of Present Illness:  Arcadio Reeves is an 80 y.o. female who presented to [facility] after falling at home. She is a poor historian. She fell at home after tripping over \"something\", and pressed her life alert. She denies any chest pain or dizziness prior to her fall. Per EMS, she was surrounded by feces when they found her. In the ED, CT Head and CXR were unrevealing. Pelvic and Knee xrays were unremarkable. Pelvic CT showed fracture of the sacrum.      Past Medical History:          Diagnosis Date    Abnormal laboratory test result     History of having positive double- stranded DNA &  recently found to be negative with a negitive ANNALEE    Anemia 2014    Follows with Dr. Coco Hackett Aortic stenosis     mod to severe AS per 10/2017 Echo    Arthritis     Cataract     Dementia     Depression     Duodenitis 2012    EGD for PUD    Episcleritis/scleritis     history of    Fracture bone        Multiple bone fractures and compression fractures    HBP (high blood pressure)     Hypothyroidism     Lup[facility]mat[facility] ([facility])     MRSA (methicillin resistant staph aureus) culture positive 06/29/2018    urine    Neuropathy     Osteoarthritis     PUD (peptic ulcer disease) 2012    gastric/prepyloric ulcers    Thyroid disease     Tremor     right hand    Venous stasis     left ankle wound; treated by Dr. Shreya Herrera       Past Surgical History:          Procedure Laterality Date    CARDIAC CATHETERIZATION  12/18/2017    Dr. Emmanuel Oliver Right     CATARACT REMOVAL Bilateral     LEG DEBRIDEMENT Left 10/12/2015    Dr. Carlos Almonte Left        Medications Prior to Admission:      Prior to Admission medications    Medication Sig Start Date End Date Taking? Authorizing Provider   memantine (NAMENDA) 10 MG tablet TAKE ONE TABLET BY MOUTH TWICE A DAY 4/22/19   Markus Echavarria MD   losartan (COZAAR) 100 MG tablet TAKE ONE HALF TABLET BY MOUTH TWICE A DAY 4/17/19   Markus Echavarria MD   gabapentin (NEURONTIN) 100 MG capsule TAKE ONE CAPSULE BY MOUTH THREE TIMES A DAY 2/18/19 3/20/19  Markus Echavarria MD   furosemide (LASIX) 20 MG tablet TAKE ONE TABLET BY MOUTH DAILY 1/22/19   Jyoti Fernando MD   primidone (MYSOLINE) 50 MG tablet TAKE ONE TABLET BY MOUTH TWICE A DAY 12/24/18   Markus Echavarria MD   levothyroxine (SYNTHROID) 25 MCG tablet TAKE ONE TABLET BY MOUTH DAILY 12/24/18   M Sky Post MD   citalopram (CELEXA) 20 MG tablet TAKE ONE TABLET BY MOUTH DAILY 12/24/18   Markus Echavarria MD   vitamin C (ASCORBIC ACID) 500 MG tablet Take 500 mg by mouth daily    Historical Provider, MD   Elastic Bandages & Supports ([address] [facility]) [address] 1 each by Does not apply route daily as needed (HEMATOMA) 12/12/18   M Sky Post MD   apixaban Chandrakant Halim) 2.5 MG TABS tablet Take 1 tablet by mouth 2 times daily 10/24/18   Jyoti Fernando MD   nystatin-triamcinolone [facility]) 163524-6.1 UNIT/GM-% cream Apply topically daily 12/14/17   Markus Echavarria MD   Multiple Vitamins-Minerals (CENTRUM SILVER 50+WOMEN PO) Take 1 tablet by mouth daily     Historical Provider, MD   aspirin 81 MG tablet Take 81 mg by mouth daily    Historical Provider, MD   calcium carbonate 600 MG TABS tablet Take 1 tablet by mouth daily    Historical Provider, MD       Allergies:  Codeine; Erythromycin; Vicodin [hydrocodone-acetaminophen]; Epinephrine; Calamine; and Nylon    Social History:      The patient currently lives at home. She says her son lives with her. TOBACCO:   reports that she quit smoking about 51 years ago. She has a 20.00 pack-year smoking history.  She has never used smokeless tobacco.  ETOH:   reports that she does not drink alcohol. Family History:      Positive as follows:        Problem Relation Age of Onset   Oscar Espinal Cancer Mother     Cancer Father         prostate    Cancer Sister         breast    High Blood Pressure Sister     High Cholesterol Sister     Heart Disease Maternal Grandmother     Cancer Maternal Grandfather     Cancer Paternal Grandmother        REVIEW OF SYSTEMS:   Pertinent positives as noted in the HPI. All other systems reviewed and negative. PHYSICAL EXAM PERFORMED:    BP (!) 171/81   Pulse 74   Temp 97.4 °F (36.3 °C) (Oral)   Resp 12   Ht 5' 5\" (1.651 m)   Wt 122 lb 9.2 oz (55.6 kg)   SpO2 100%   BMI 20.40 kg/m²     General appearance:  No apparent distress, appears stated age and cooperative. HEENT:  Normal cephalic, atraumatic without obvious deformity. Pupils irregular s/p incisions in the inferior iris. Conjunctivae/corneas clear. Respiratory:  Normal respiratory effort. Clear to auscultation, bilaterally without Rales/Wheezes/Rhonchi. Cardiovascular:  Regular rate and rhythm with normal S1/S2  Abdomen: Soft, non-tender, non-distended with normal bowel sounds. Musculoskeletal:  No clubbing, cyanosis or edema bilaterally. Skin: Skin color, texture, turgor normal.  Warm and dry  Neurologic:  Neurovascularly intact without any focal sensory/motor deficits. Cranial nerves: II-XII intact, grossly non-focal.  Psychiatric:  Alert and oriented, thought content appropriate, normal insight  Capillary Refill: Brisk,< 3 seconds   Peripheral Pulses: +2 palpable, equal bilaterally       Labs:     Recent Labs     04/30/19  1734   WBC 1.7*   HGB 11.0*   HCT 32.4*   *     Recent Labs     04/30/19  1734      K 3.8      CO2 26   BUN 23*   CREATININE 0.7   CALCIUM 9.0     Recent Labs     04/30/19  1734   AST 19   ALT 12   BILITOT <0.2   ALKPHOS 80     No results for input(s): INR in the last 72 hours. No results for input(s): Clarance Amaya in the last 72 hours.     Urinalysis: Lab Results   Component Value Date    NITRU Negative 04/30/2019    WBCUA 3-5 04/23/2019    BACTERIA 1+ 12/23/2009    RBCUA 1 12/13/2018    RBCUA NEGATIVE 01/03/2018    BLOODU Negative 04/30/2019    SPECGRAV 1.014 04/30/2019    GLUCOSEU Negative 04/30/2019    GLUCOSEU NEGATIVE 12/23/2009       Radiology:         CT PELVIS WO CONTRAST Additional Contrast? None   Final Result   Fracture of the distal sacrum. XR PELVIS (1-2 VIEWS)   Preliminary Result   Osteopenia. No displaced fracture. Given the degree of osteopenia, nondisplaced fractures may be   radiographically occult. If pain or concern for fracture persists, consider   MR imaging. XR KNEE RIGHT (1-2 VIEWS)   Final Result   No acute fracture dislocation identified. Severe tricompartmental   degenerative disease. XR CHEST STANDARD (2 VW)   Final Result   No acute process. Hiatal hernia         CT Head WO Contrast   Final Result   No acute intracranial abnormality. Stable age-related global cerebral volume loss and chronic microvascular   ischemic disease. ASSESSMENT/PLAN:    Active Hospital Problems    Diagnosis Date Noted    Fracture, sacrum/coccyx, closed, initial encounter ([facility] [address] [S32.10XA, S32.2XXA] 04/30/2019     Sacrum fracture  -PT/OT  -appreciate ortho input    Frequent falls  -PT/OT    Dementia  -social work, likely needs placement, can not care for herself  -continue namenda    HTN  -continue losartan  -PRN hydralazine    Hx TAVR  -per cardiology note dated 4/1/19, continue eliquis for at least 6 months    Hx hypothyroidism  -continue synthroid            DVT Prophylaxis: eliquis  Diet: DIET GENERAL;  Code Status: Full Code    PT/OT Eval Status: pending    Dispo - anticipate DC 1-2 days       Valerie José MD    Thank you Alva Carrasco MD for the opportunity to be involved in this patient's care.

## 2019-05-02 VITALS
OXYGEN SATURATION: 95 % | HEART RATE: 70 BPM | RESPIRATION RATE: 18 BRPM | HEIGHT: 65 IN | DIASTOLIC BLOOD PRESSURE: 70 MMHG | SYSTOLIC BLOOD PRESSURE: 120 MMHG | BODY MASS INDEX: 20.17 KG/M2 | WEIGHT: 121.03 LBS | TEMPERATURE: 98.3 F

## 2019-05-02 LAB
BILIRUBIN URINE: ABNORMAL
BLOOD, URINE: NEGATIVE
CLARITY: ABNORMAL
COLOR: YELLOW
EKG ATRIAL RATE: 49 BPM
EKG DIAGNOSIS: NORMAL
EKG P AXIS: 71 DEGREES
EKG P-R INTERVAL: 228 MS
EKG Q-T INTERVAL: 474 MS
EKG QRS DURATION: 132 MS
EKG QTC CALCULATION (BAZETT): 428 MS
EKG R AXIS: 37 DEGREES
EKG T AXIS: -35 DEGREES
EKG VENTRICULAR RATE: 49 BPM
EPITHELIAL CELLS, UA: 5 /HPF (ref 0–5)
GLUCOSE URINE: NEGATIVE MG/DL
HYALINE CASTS: 2 /LPF (ref 0–8)
KETONES, URINE: NEGATIVE MG/DL
LEUKOCYTE ESTERASE, URINE: ABNORMAL
MICROSCOPIC EXAMINATION: YES
NITRITE, URINE: NEGATIVE
PH UA: 6 (ref 5–8)
PROTEIN UA: NEGATIVE MG/DL
RBC UA: 2 /HPF (ref 0–4)
SPECIFIC GRAVITY UA: 1.02 (ref 1–1.03)
T4 FREE: 1.2 NG/DL (ref 0.9–1.8)
TSH SERPL DL<=0.05 MIU/L-ACNC: 3.83 UIU/ML (ref 0.27–4.2)
URINE TYPE: ABNORMAL
UROBILINOGEN, URINE: 0.2 E.U./DL
WBC UA: 2 /HPF (ref 0–5)

## 2019-05-02 PROCEDURE — 94760 N-INVAS EAR/PLS OXIMETRY 1: CPT

## 2019-05-02 PROCEDURE — 81001 URINALYSIS AUTO W/SCOPE: CPT

## 2019-05-02 PROCEDURE — 97530 THERAPEUTIC ACTIVITIES: CPT

## 2019-05-02 PROCEDURE — 97116 GAIT TRAINING THERAPY: CPT

## 2019-05-02 PROCEDURE — G0378 HOSPITAL OBSERVATION PER HR: HCPCS

## 2019-05-02 PROCEDURE — 84439 ASSAY OF FREE THYROXINE: CPT

## 2019-05-02 PROCEDURE — 84443 ASSAY THYROID STIM HORMONE: CPT

## 2019-05-02 PROCEDURE — 2580000003 HC RX 258: Performed by: INTERNAL MEDICINE

## 2019-05-02 PROCEDURE — 6370000000 HC RX 637 (ALT 250 FOR IP): Performed by: INTERNAL MEDICINE

## 2019-05-02 PROCEDURE — 36415 COLL VENOUS BLD VENIPUNCTURE: CPT

## 2019-05-02 RX ADMIN — ASPIRIN 81 MG 81 MG: 81 TABLET ORAL at 09:08

## 2019-05-02 RX ADMIN — PRIMIDONE 50 MG: 50 TABLET ORAL at 09:08

## 2019-05-02 RX ADMIN — LEVOTHYROXINE SODIUM 25 MCG: 25 TABLET ORAL at 06:40

## 2019-05-02 RX ADMIN — CITALOPRAM HYDROBROMIDE 20 MG: 20 TABLET ORAL at 09:08

## 2019-05-02 RX ADMIN — CALCIUM 500 MG: 500 TABLET ORAL at 09:08

## 2019-05-02 RX ADMIN — LOSARTAN POTASSIUM 50 MG: 50 TABLET ORAL at 09:08

## 2019-05-02 RX ADMIN — Medication 10 ML: at 09:08

## 2019-05-02 RX ADMIN — MEMANTINE HYDROCHLORIDE 10 MG: 5 TABLET, FILM COATED ORAL at 09:08

## 2019-05-02 ASSESSMENT — PAIN SCALES - GENERAL: PAINLEVEL_OUTOF10: 0
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
## 2019-05-02 NOTE — DISCHARGE SUMMARY
apparent distress, appears stated age and cooperative. HEENT:  Normal cephalic, atraumatic without obvious deformity. Pupils equal, round, and reactive to light. Extra ocular muscles intact. Conjunctivae/corneas clear. Neck: Supple, with full range of motion. No jugular venous distention. Trachea midline. Respiratory:  Normal respiratory effort. Clear to auscultation, bilaterally without Rales/Wheezes/Rhonchi. Cardiovascular:  Regular rate and rhythm with normal S1/S2 without murmurs, rubs or gallops. Abdomen: Soft, non-tender, non-distended with normal bowel sounds. Musculoskeletal:  No clubbing, cyanosis or edema bilaterally. Full range of motion without deformity. Skin: Skin color, texture, turgor normal.  No rashes or lesions. Neurologic:  Neurovascularly intact without any focal sensory/motor deficits. Cranial nerves: II-XII intact, grossly non-focal.  Psychiatric:  Alert and oriented, thought content appropriate, normal insight  Capillary Refill: Brisk,< 3 seconds   Peripheral Pulses: +2 palpable, equal bilaterally       Labs: For convenience and continuity at follow-up the following most recent labs are provided:      CBC:    Lab Results   Component Value Date    WBC 1.8 05/01/2019    HGB 11.6 05/01/2019    HCT 36.0 05/01/2019     05/01/2019       Renal:    Lab Results   Component Value Date     05/01/2019    K 4.0 05/01/2019     05/01/2019    CO2 28 05/01/2019    BUN 19 05/01/2019    CREATININE 0.8 05/01/2019    CALCIUM 8.4 05/01/2019         Significant Diagnostic Studies    Radiology:   CT PELVIS WO CONTRAST Additional Contrast? None   Final Result   Fracture of the distal sacrum. XR PELVIS (1-2 VIEWS)   Final Result   Osteopenia. No displaced fracture. Given the degree of osteopenia, nondisplaced fractures may be   radiographically occult. If pain or concern for fracture persists, consider   MR imaging.          XR KNEE RIGHT (1-2 VIEWS)   Final Result   No acute fracture dislocation identified. Severe tricompartmental   degenerative disease. XR CHEST STANDARD (2 VW)   Final Result   No acute process. Hiatal hernia         CT Head WO Contrast   Final Result   No acute intracranial abnormality. Stable age-related global cerebral volume loss and chronic microvascular   ischemic disease.                 Consults:     IP CONSULT TO SOCIAL WORK  IP CONSULT TO HOSPITALIST  IP CONSULT TO HOME CARE NEEDS    Disposition:  Home with home health     Condition at Discharge: Stable    Discharge Instructions/Follow-up:  PCP in 1 week    Code Status:  Full Code     Activity: activity as tolerated    Diet: regular diet      Discharge Medications:     Current Discharge Medication List           Details   losartan (COZAAR) 100 MG tablet TAKE ONE HALF TABLET BY MOUTH TWICE A DAY  Qty: 30 tablet, Refills: 0      primidone (MYSOLINE) 50 MG tablet TAKE ONE TABLET BY MOUTH TWICE A DAY  Qty: 60 tablet, Refills: 5      memantine (NAMENDA) 10 MG tablet TAKE ONE TABLET BY MOUTH TWICE A DAY  Qty: 60 tablet, Refills: 3      furosemide (LASIX) 20 MG tablet TAKE ONE TABLET BY MOUTH DAILY  Qty: 30 tablet, Refills: 3      levothyroxine (SYNTHROID) 25 MCG tablet TAKE ONE TABLET BY MOUTH DAILY  Qty: 30 tablet, Refills: 5      citalopram (CELEXA) 20 MG tablet TAKE ONE TABLET BY MOUTH DAILY  Qty: 30 tablet, Refills: 5      vitamin C (ASCORBIC ACID) 500 MG tablet Take 500 mg by mouth daily      Elastic Bandages & Supports (MEDICAL COMPRESSION STOCKINGS) MISC 1 each by Does not apply route daily as needed (HEMATOMA)  Qty: 1 each, Refills: 0    Associated Diagnoses: Leg hematoma, left, initial encounter      nystatin-triamcinolone (MYCOLOG II) 509179-1.1 UNIT/GM-% cream Apply topically daily  Qty: 15 g, Refills: 0      Multiple Vitamins-Minerals (CENTRUM SILVER 50+WOMEN PO) Take 1 tablet by mouth daily       aspirin 81 MG tablet Take 81 mg by mouth daily      calcium carbonate 600 MG TABS tablet Take 1 tablet by mouth daily             Time Spent on discharge is more than 30 minutes in the examination, evaluation, counseling and review of medications and discharge plan. Signed:    Anne Levi MD   5/2/2019      Thank you Love Self MD for the opportunity to be involved in this patient's care. If you have any questions or concerns please feel free to contact me at [phone].

## 2019-05-02 NOTE — PROGRESS NOTES
Patient is alert & oriented x2, x1 assist with walker, 2/4 bed rails up, bed in lowest position, fall precautions in place, call light within reach. Morning assessment complete. Morning medications given. Breakfast ordered for pt. Will cont to monitor and reassess.   Electronically signed by Jeny Hernández RN on 5/2/2019 at 9:13 AM

## 2019-05-02 NOTE — PROGRESS NOTES
Pt resting quietly in bed with eyes closed, resp easy and unlabored. Appears comfortable. No s/s of acute distress noted. Bed alarm engaged. Call light is within reach. Will continue to monitor.

## 2019-05-02 NOTE — CARE COORDINATION
No return call from [address] . Patient has been discharged with home care orders. Son showed up to pick her up and did not return call to me as requested by bedside RN. He is agreeable to [facility] providing service again. They have seen her in the past and recently stopped in March as they would not answer door or calls. Referred to liaison who will follow up. Discussed case with [facility] (Rodriguez Chavarria @ [phone]) who is familiar with case. They will follow up. They asked if MD would sign statement of belief which Dr. Daniela Roblero did. This info was faxed to [facility].    Electronically signed by Ankur Hutchison on 5/2/2019 at [address] [facility] [address] PM   #[phone]
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
## 2019-05-02 NOTE — CARE COORDINATION
BARBIE called Son Harsha Yip in follow up [phone]. Discussed that his mother is unsafe home alone. Harsha Yip stated that he agrees. Harsha Yip reported that Physical Therapy is coming to the home tomorrow. Discussed placement, Harsha Yip reports that his mother has a friend that she visits twice a month at [facility] and he would like for her to go there if she is able. BARBIE advised that she would reach out to [facility] and ask her to follow up with son for possible placement from the community. Harsha Yip also discussed applying for medicaid, for payment for his mother if he is not going to lose the family home.

## 2019-05-02 NOTE — PLAN OF CARE
Problem: Falls - Risk of:  Goal: Will remain free from falls  Description  Will remain free from falls  5/2/2019 0742 by Rose Chan RN  Outcome: Ongoing  Note:   Fall risk assessment completed. Fall precautions in place. Call light within reach. Pt educated on calling for assistance before getting up. Walkway free of clutter. Will continue to monitor. Problem: Falls - Risk of:  Goal: Absence of physical injury  Description  Absence of physical injury  5/2/2019 0742 by Rose Chna RN  Outcome: Ongoing  Note:   Pt is free of injury. No injury noted. Fall precautions in place. Call light within reach. Will monitor. Problem: Risk for Impaired Skin Integrity  Goal: Tissue integrity - skin and mucous membranes  Description  Structural intactness and normal physiological function of skin and  mucous membranes. 5/2/2019 0742 by Rose Chan RN  Outcome: Ongoing  Note:   Skin assessment completed every shift. Pt assessed for incontinence, appropriate barrier cream applied prn. Pt encouraged to turn/rotate every 2 hours. Assistance provided if pt unable to do so themselves. Problem: Pain:  Goal: Pain level will decrease  Description  Pain level will decrease  5/2/2019 0742 by Rose Chan RN  Outcome: Ongoing  Note:   Pain/discomfort being managed with PRN analgesics per MD orders. Pt able to express presence and absence of pain and rate pain appropriately using numerical scale.

## 2019-05-02 NOTE — PLAN OF CARE
Problem: Falls - Risk of:  Goal: Will remain free from falls  Description  Will remain free from falls  Outcome: Ongoing  Note:   Fall risk assessment completed. Fall precautions in place. Bed in lowest position, wheels locked, bed/chair exit alarm in place, call light within reach, and non skid footwear on. Walkway free of clutter. Pt able to make needs known. Pt educated to use call light when needing to get up, and pt utilizes call light to make needs known. Will continue to monitor. Problem: Falls - Risk of:  Goal: Absence of physical injury  Description  Absence of physical injury  Outcome: Ongoing  Note:   Pt absent from physical injury on this shift      Problem: Risk for Impaired Skin Integrity  Goal: Tissue integrity - skin and mucous membranes  Description  Structural intactness and normal physiological function of skin and  mucous membranes. Outcome: Ongoing  Note:   Skin being assessed during this shift. Anuj protocol in place. Feet being elevated. Encouraging pt to turn every 2 hours. No signs of new skin breakdown. Will continue to monitor and reassess. Problem: Pain:  Goal: Pain level will decrease  Description  Pain level will decrease  Outcome: Ongoing  Note:   Assessing and monitoring pt's pain. PRN pain medication being given if ordered. Educating pt on nonpharmacologic interventions for pain control. Will continue to monitor and reassess. Problem: Pain:  Goal: Control of acute pain  Description  Control of acute pain  Outcome: Ongoing  Note:   Assessing and monitoring pt's pain. PRN pain medication being given if ordered. Educating pt on nonpharmacologic interventions for pain control. Will continue to monitor and reassess. Problem: Pain:  Goal: Control of chronic pain  Description  Control of chronic pain  Outcome: Ongoing  Note:   Assessing and monitoring pt's pain. PRN pain medication being given if ordered.  Educating pt on nonpharmacologic interventions for pain control. Will continue to monitor and reassess.

## 2019-05-02 NOTE — PROGRESS NOTES
Physical Therapy    Facility/Department: [address] ORTHOPEDICS      Treatment note    NAME: Nino Poe  : 1930  MRN: [id number]    Date of Service: 2019    Discharge Recommendations:  -assessment indicates she needs 24 hour assist and supervision  -recommend \"hands on\" close CGA for her mobility on walker  -high fall risk    Assessment   Body structures, Functions, Activity limitations: Decreased functional mobility ; Decreased ADL status; Decreased cognition;Decreased balance  Prognosis: Good  REQUIRES PT FOLLOW UP: Yes  Activity Tolerance  Activity Tolerance: Patient limited by cognitive status; Patient limited by endurance       Patient Diagnosis(es): The primary encounter diagnosis was Fall from bed, initial encounter. Diagnoses of Frequent falls and Dementia without behavioral disturbance, unspecified dementia type were also pertinent to this visit. has a past medical history of Abnormal laboratory test result, Anemia, Anxiety, Aortic stenosis, Arthritis, Cataract, Dementia, Depression, Duodenitis, Episcleritis/scleritis, Fracture bone, HBP (high blood pressure), Hypothyroidism, Lupus (systemic lupus erythematosus) (HCC), MRSA (methicillin resistant staph aureus) culture positive, Neuropathy, Osteoarthritis, PUD (peptic ulcer disease), Thyroid disease, Tremor, and Venous stasis. has a past surgical history that includes Cataract removal (Bilateral); Total knee arthroplasty (Left); Carpal tunnel release (Right); Cardiac catheterization (2017); and Leg Debridement (Left, 10/12/2015). Restrictions  Restrictions/Precautions  Restrictions/Precautions: Fall Risk  Vision/Hearing  Vision: (PPMH indicates macular degeneration)     Subjective  General  Chart Reviewed:  Yes  Additional Pertinent Hx: here due to AMS - found down at home  Response To Previous Treatment: Patient unable to report, no changes reported from family or staff  Family / Caregiver Present: (friend Avani)  Follows Commands: Within Functional Limits  Subjective  Subjective: remains alert oriented to self - able to identify her friend Martinez Wilder  - agreeable to PT OT session and to assess her ability for steps  Pain Screening  Patient Currently in Pain: No  Social/Functional History  Social/Functional History  Lives With: Son  Type of Home: House  Home Layout: Two level, Laundry in basement  Home Access: Stairs to enter with rails  Entrance Stairs - Number of Steps: 10-12 YANY   Entrance Stairs - Rails: Both  Bathroom Shower/Tub: Tub/Shower unit(Pt sponge bathes )  Bathroom Toilet: Handicap height  Bathroom Accessibility: Accessible  Home Equipment: Rolling walker, Alert [employer] Help From: Family, Home health  ADL Assistance: Needs assistance  Homemaking Assistance: (Son and aid assist)  Ambulation Assistance: Independent(with RW)  Transfer Assistance: Independent  Additional Comments: Pt requires assist with bathing via home aid, IADLs, and house maintenance. Above info is from previous hospitalization - pt poor historian. Objective  Transfers  Sit to Stand: Contact guard assistance  Stand to sit: Contact guard assistance  Ambulation  Ambulation?: Yes  Ambulation 1  Surface: level tile  Device: Rolling Walker  Assistance: Contact guard assistance  Quality of Gait: small step through pattern with narrow base of support   Distance: in Ortho gym for ~40 feet overall   Comments: maintains midline in rolling walker but becomes unsteady when challenged with more functional turns   Stairs/Curb  Stairs? : (close CGA - strict hands on assist to climb and decend 4 steps using bilateral rails)  Balance  Comments: midline in sitting on the recliner      -midline in  static stance on the walker     -dynamic is fair on rolling walker and fair minus on steps  Plan   Plan  Times per week: 3-5  Current Treatment Recommendations: Functional Mobility Training, Transfer Training, Gait Training, Positioning, Patient/Caregiver Education & Training  Safety Devices  Type of devices:  All fall risk precautions in place, Call light within reach, Chair alarm in place, Left in chair, Nurse notified(Meena)  AM-PAC Score  AM-PAC Inpatient Mobility Raw Score : 16  AM-PAC Inpatient T-Scale Score : 40.78  Mobility Inpatient CMS 0-100% Score: 54.16  Mobility Inpatient CMS G-Code Modifier : CK  Goals  Short term goals  Time Frame for Short term goals: 1-2 days   Short term goal 1: bed mobility at a  Short term goal 2: transfers at [address] term goal 3: ambulation at a/[facility] rolling walker for 40-50 feet   Patient Goals   Patient goals : none formulated at this session       Therapy Time   Individual Concurrent Group Co-treatment   Time In 1430         Time Out 1515         Minutes [address] [facility],[address], PT

## 2019-05-03 ENCOUNTER — TELEPHONE (OUTPATIENT)
Dept: INTERNAL MEDICINE CLINIC | Age: 84
End: 2019-05-03

## 2019-05-03 LAB
BASOPHILS ABSOLUTE: 0 K/UL (ref 0–0.2)
BASOPHILS RELATIVE PERCENT: 0.7 %
EOSINOPHILS ABSOLUTE: 0 K/UL (ref 0–0.6)
EOSINOPHILS RELATIVE PERCENT: 1.5 %
HCT VFR BLD CALC: 36 % (ref 36–48)
HEMATOLOGY PATH CONSULT: NO
HEMOGLOBIN: 11.6 G/DL (ref 12–16)
LYMPHOCYTES ABSOLUTE: 0.6 K/UL (ref 1–5.1)
LYMPHOCYTES RELATIVE PERCENT: 30.5 %
MCH RBC QN AUTO: 28 PG (ref 26–34)
MCHC RBC AUTO-ENTMCNC: 32.2 G/DL (ref 31–36)
MCV RBC AUTO: 86.8 FL (ref 80–100)
MONOCYTES ABSOLUTE: 0 K/UL (ref 0–1.3)
MONOCYTES RELATIVE PERCENT: 2.6 %
NEUTROPHILS ABSOLUTE: 1.2 K/UL (ref 1.7–7.7)
NEUTROPHILS RELATIVE PERCENT: 64.7 %
PDW BLD-RTO: 15.7 % (ref 12.4–15.4)
PLATELET # BLD: 145 K/UL (ref 135–450)
PMV BLD AUTO: 8.3 FL (ref 5–10.5)
RBC # BLD: 4.14 M/UL (ref 4–5.2)
WBC # BLD: 1.8 K/UL (ref 4–11)

## 2019-05-03 NOTE — TELEPHONE ENCOUNTER
Pt was in hospital due to fall and fx pelvis - [facility]. Home care was ordered. When Koby Samaniego arrived pt was oriented to name only. She was alone. Requires assistance for transfers, safety, etc. Medicines were left on cup by table with no food for pt to eat. Can't reach son. Can't open her in home care now. It is unsafe for her to be left alone. There is an aid supposed to come in at 12:30 for a bath. Concerned that she is being left alone on a daily basis.

## 2019-05-03 NOTE — TELEPHONE ENCOUNTER
She has had multiple hospitalizations recently. All note that she needs 24 hr supervision and should go to ecf. Not sure why that never happens. I haven't seen this person in more than a yr. See if Lendell Dadds can help. I know the  at the hospital was involved.

## 2019-05-05 LAB — VITAMIN B1, PLASMA: 13 NMOL/L (ref 4–15)

## 2019-05-06 ENCOUNTER — CARE COORDINATION (OUTPATIENT)
Dept: CARE COORDINATION | Age: 84
End: 2019-05-06

## 2019-05-06 NOTE — CARE COORDINATION
RNCC received a return call from  Levy from [facility]. Rock Lockett states that she did also end up placing a call to APS on Friday and tried to talk with the son on a couple of occasions with no return call. Rock Lockett states that she did receive a call back from APS this morning and that they will be going to the house today to do an evaluation. Rock Lockett states that she will continue to follow up with APS and hopefully get the patient placed in ECF or additional assistance. Writer informed  Levy that I would update Dr. Lissy Medina.

## 2019-05-06 NOTE — CARE COORDINATION
RNCC called Corrinne Boston at [facility]. who called with concerns about patient's safety. Writer informed Corrinne Boston that Dr. Karli Forbes wanted me to reach out to see if any assistance could be offered. Writer reviewed the notes further to find that an APS case had been opened by the SW and MD at the hospital prior to discharge. BARBIE was also looking into possible placement for the patient to a facility. Writer advised Corrinne Boston to call me back if I can provide any other assistance.

## 2019-05-06 NOTE — CARE COORDINATION
RNCC placed a second call to Salbador Morales after reviewing the notes from the hospital  that a APS case has been opened and the hospital MD signed off on it. Writer also saw that the hospital  spoke with the son about placement at [facility]. Writer again Gary Flores for Salbador Morales to call me back if there was anything I could do to help. Writer just wanted to give her an update.

## 2019-05-07 ENCOUNTER — TELEPHONE (OUTPATIENT)
Dept: OTHER | Age: 84
End: 2019-05-07

## 2019-05-07 NOTE — TELEPHONE ENCOUNTER
SW received call from [facility],  Geraldo Sigala is looking for Statement of Expert Evaluation. She reached out because this SW made APS Referral.  SW just received email from son stating\" Physical Therapist said mom was \"incoherent\" and refused to work with her. Adry David Adry David \"    SW emailed son back offering to assist him in getting his mother services in the home, or assist with placement in SNF,  that she can not be left alone. BARBIE discussed with Jeremy Fulton that the LEONIDAS Burleson wrote statement of Brief and it is being sent back to [place] from [employer], due to her needing the original.  Geraldo Sigala advised that she needs a Statement of Expert Evaluation. Dr. David Slade is not on staff today.

## 2019-05-09 ENCOUNTER — TELEPHONE (OUTPATIENT)
Dept: OTHER | Age: 84
End: 2019-05-09

## 2019-05-09 NOTE — TELEPHONE ENCOUNTER
BARBIE called APS  Jeremiasatiya Arambula [phone] and advised that I had original Statement of belief. Faxed document to her and advised the original is in my office.

## 2019-05-17 ENCOUNTER — TELEPHONE (OUTPATIENT)
Dept: CARDIOLOGY CLINIC | Age: 84
End: 2019-05-17

## 2019-05-17 NOTE — TELEPHONE ENCOUNTER
[facility] sent another form for you to sign and I have sent the previous form back to them stating that you prefer the pcp to sign per your request.They sent another form again and I spoke with Ramirez Pro she states that the son states she has no pcp. I have the form in your folder. The service is to help her get  into a nursing home.   Hands on ADLs MED administration,and 24 hour care

## 2019-05-22 RX ORDER — FUROSEMIDE 20 MG/1
TABLET ORAL
Qty: 30 TABLET | Refills: 2 | Status: SHIPPED | OUTPATIENT
Start: 2019-05-22

## 2019-05-30 ENCOUNTER — TELEPHONE (OUTPATIENT)
Dept: CARDIOLOGY CLINIC | Age: 84
End: 2019-05-30

## 2019-05-30 NOTE — TELEPHONE ENCOUNTER
Had to cancel appt and echo today . Tried to rs but no other open Thursday appts for months except for 7/18/. DCE has open appt 7/18 but echo doesn't . Asked central scheduling to call the dept but she said unless it is marked stat she is not supposed to ask the dept to fit appt in . When can this patient be seen other than 7/18?

## 2019-05-30 NOTE — TELEPHONE ENCOUNTER
Called central sched to see when echo can be sched same day with dce. They will call back once they have spoken to the department about squeezing pt in for echo.

## 2019-05-31 NOTE — TELEPHONE ENCOUNTER
Central scheduling called. They squeezed pt in for 8:30 on July 18 with an arrival time of 8:00 for Echo. Scheduled Pt to see DCE at 10:00 on same day. Have not called pt yet to give appt times due to it being so early in the morning. Will call in an hour to schedule.

## 2019-06-05 NOTE — CARE COORDINATION
BARBIE Consult:    Patient unsafe at home alone    SW reviewed Patient's chart   Patient in ED Today- Home Alone with Dementia. 4/23/19- Patient pressed [facility] home alone, thought someone was in her home, son advised patient unsafe home alone. 12/13/18- Patient in ED placed in OBS, unable to reach son, patient was home alone. Patient unsafe home alone. Upon discharge Son was to set up Adult , work with [employer] [facility] and [employer]. 12/2/18- APS referral made, due to patient being home alone with dementia. 12/19/17- Patient placed at Home at [facility] upon discharge. SW has call out to son,Thomas  [phone], waiting call back. 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  Hearing loss constant, nonfluctuating, severe.  Born in [place], failed  hearing screen.  No  issues.  No one in family with early onset hearing loss.  Has been wearing hearing aids since 2019.  Mom hasn't noted ear infections.  Mom also notices hearing loss.

## 2019-06-18 RX ORDER — LOSARTAN POTASSIUM 100 MG/1
100 TABLET ORAL 2 TIMES DAILY
Qty: 90 TABLET | Refills: 3 | Status: SHIPPED | OUTPATIENT
Start: 2019-06-18

## 2019-06-24 RX ORDER — PRIMIDONE 50 MG/1
TABLET ORAL
Qty: 60 TABLET | Refills: 4 | Status: SHIPPED | OUTPATIENT
Start: 2019-06-24

## 2019-06-24 RX ORDER — LEVOTHYROXINE SODIUM 0.03 MG/1
TABLET ORAL
Qty: 30 TABLET | Refills: 4 | Status: SHIPPED | OUTPATIENT
Start: 2019-06-24

## 2019-06-24 RX ORDER — MEMANTINE HYDROCHLORIDE 10 MG/1
TABLET ORAL
Qty: 60 TABLET | Refills: 2 | Status: SHIPPED | OUTPATIENT
Start: 2019-06-24

## 2019-06-24 RX ORDER — CITALOPRAM 20 MG/1
TABLET ORAL
Qty: 30 TABLET | Refills: 4 | Status: SHIPPED | OUTPATIENT
Start: 2019-06-24

## 2019-06-24 RX ORDER — FUROSEMIDE 20 MG/1
TABLET ORAL
Qty: 30 TABLET | Refills: 1 | OUTPATIENT
Start: 2019-06-24

## 2019-06-25 ENCOUNTER — APPOINTMENT (OUTPATIENT)
Dept: CT IMAGING | Age: 84
End: 2019-06-25
Payer: MEDICARE

## 2019-06-25 ENCOUNTER — APPOINTMENT (OUTPATIENT)
Dept: GENERAL RADIOLOGY | Age: 84
End: 2019-06-25
Payer: MEDICARE

## 2019-06-25 ENCOUNTER — HOSPITAL ENCOUNTER (EMERGENCY)
Age: 84
Discharge: NURSING, REHAB OR LONG-TERM CARE | End: 2019-06-25
Attending: EMERGENCY MEDICINE
Payer: MEDICARE

## 2019-06-25 VITALS
RESPIRATION RATE: 13 BRPM | SYSTOLIC BLOOD PRESSURE: 173 MMHG | TEMPERATURE: 97.8 F | BODY MASS INDEX: 22.85 KG/M2 | WEIGHT: 137.13 LBS | HEIGHT: 65 IN | DIASTOLIC BLOOD PRESSURE: 62 MMHG | OXYGEN SATURATION: 100 % | HEART RATE: 55 BPM

## 2019-06-25 DIAGNOSIS — D70.9 NEUTROPENIA, UNSPECIFIED TYPE (HCC): ICD-10-CM

## 2019-06-25 DIAGNOSIS — S62.647A NONDISPLACED FRACTURE OF PROXIMAL PHALANX OF LEFT LITTLE FINGER, INITIAL ENCOUNTER FOR CLOSED FRACTURE: ICD-10-CM
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
## 2019-06-25 DIAGNOSIS — D61.818 PANCYTOPENIA (HCC): ICD-10-CM

## 2019-06-25 DIAGNOSIS — S81.812A LACERATION OF LEFT LOWER EXTREMITY, INITIAL ENCOUNTER: Primary | ICD-10-CM

## 2019-06-25 LAB
A/G RATIO: 1 (ref 1.1–2.2)
ALBUMIN SERPL-MCNC: 3.4 G/DL (ref 3.4–5)
ALP BLD-CCNC: 66 U/L (ref 40–129)
ALT SERPL-CCNC: 11 U/L (ref 10–40)
ANION GAP SERPL CALCULATED.3IONS-SCNC: 8 MMOL/L (ref 3–16)
AST SERPL-CCNC: 14 U/L (ref 15–37)
BASOPHILS ABSOLUTE: 0 K/UL (ref 0–0.2)
BASOPHILS RELATIVE PERCENT: 0.4 %
BILIRUB SERPL-MCNC: <0.2 MG/DL (ref 0–1)
BILIRUBIN URINE: NEGATIVE
BLOOD, URINE: NEGATIVE
BUN BLDV-MCNC: 36 MG/DL (ref 7–20)
CALCIUM SERPL-MCNC: 8.7 MG/DL (ref 8.3–10.6)
CHLORIDE BLD-SCNC: 107 MMOL/L (ref 99–110)
CLARITY: CLEAR
CO2: 28 MMOL/L (ref 21–32)
COLOR: YELLOW
CREAT SERPL-MCNC: 1.2 MG/DL (ref 0.6–1.2)
EOSINOPHILS ABSOLUTE: 0 K/UL (ref 0–0.6)
EOSINOPHILS RELATIVE PERCENT: 1.8 %
GFR AFRICAN AMERICAN: 51
GFR NON-AFRICAN AMERICAN: 42
GLOBULIN: 3.3 G/DL
GLUCOSE BLD-MCNC: 81 MG/DL (ref 70–99)
GLUCOSE URINE: NEGATIVE MG/DL
HCT VFR BLD CALC: 24.1 % (ref 36–48)
HEMATOLOGY PATH CONSULT: NO
HEMOGLOBIN: 8.2 G/DL (ref 12–16)
KETONES, URINE: NEGATIVE MG/DL
LEUKOCYTE ESTERASE, URINE: NEGATIVE
LYMPHOCYTES ABSOLUTE: 0.5 K/UL (ref 1–5.1)
LYMPHOCYTES RELATIVE PERCENT: 61.1 %
MCH RBC QN AUTO: 30.8 PG (ref 26–34)
MCHC RBC AUTO-ENTMCNC: 33.8 G/DL (ref 31–36)
MCV RBC AUTO: 91 FL (ref 80–100)
MICROSCOPIC EXAMINATION: NORMAL
MONOCYTES ABSOLUTE: 0 K/UL (ref 0–1.3)
MONOCYTES RELATIVE PERCENT: 3.7 %
NEUTROPHILS ABSOLUTE: 0.3 K/UL (ref 1.7–7.7)
NEUTROPHILS RELATIVE PERCENT: 33 %
NITRITE, URINE: NEGATIVE
OCCULT BLOOD DIAGNOSTIC: NORMAL
PDW BLD-RTO: 16.7 % (ref 12.4–15.4)
PH UA: 5.5 (ref 5–8)
PLATELET # BLD: 70 K/UL (ref 135–450)
PMV BLD AUTO: 7.2 FL (ref 5–10.5)
POTASSIUM REFLEX MAGNESIUM: 4.5 MMOL/L (ref 3.5–5.1)
PROTEIN UA: NEGATIVE MG/DL
RBC # BLD: 2.65 M/UL (ref 4–5.2)
SODIUM BLD-SCNC: 143 MMOL/L (ref 136–145)
SPECIFIC GRAVITY UA: 1.02 (ref 1–1.03)
TOTAL PROTEIN: 6.7 G/DL (ref 6.4–8.2)
URINE REFLEX TO CULTURE: NORMAL
URINE TYPE: NORMAL
UROBILINOGEN, URINE: 0.2 E.U./DL
WBC # BLD: 0.8 K/UL (ref 4–11)

## 2019-06-25 PROCEDURE — 4500000025 HC ED LEVEL 5 PROCEDURE

## 2019-06-25 PROCEDURE — 81003 URINALYSIS AUTO W/O SCOPE: CPT

## 2019-06-25 PROCEDURE — 99285 EMERGENCY DEPT VISIT HI MDM: CPT

## 2019-06-25 PROCEDURE — 6360000002 HC RX W HCPCS: Performed by: PHYSICIAN ASSISTANT

## 2019-06-25 PROCEDURE — 90715 TDAP VACCINE 7 YRS/> IM: CPT | Performed by: PHYSICIAN ASSISTANT

## 2019-06-25 PROCEDURE — 73110 X-RAY EXAM OF WRIST: CPT

## 2019-06-25 PROCEDURE — 73590 X-RAY EXAM OF LOWER LEG: CPT

## 2019-06-25 PROCEDURE — 2500000003 HC RX 250 WO HCPCS: Performed by: PHYSICIAN ASSISTANT

## 2019-06-25 PROCEDURE — 90471 IMMUNIZATION ADMIN: CPT | Performed by: PHYSICIAN ASSISTANT

## 2019-06-25 PROCEDURE — G0328 FECAL BLOOD SCRN IMMUNOASSAY: HCPCS

## 2019-06-25 PROCEDURE — 73130 X-RAY EXAM OF HAND: CPT

## 2019-06-25 PROCEDURE — 72125 CT NECK SPINE W/O DYE: CPT

## 2019-06-25 PROCEDURE — 85025 COMPLETE CBC W/AUTO DIFF WBC: CPT

## 2019-06-25 PROCEDURE — 80053 COMPREHEN METABOLIC PANEL: CPT

## 2019-06-25 PROCEDURE — 70450 CT HEAD/BRAIN W/O DYE: CPT

## 2019-06-25 RX ORDER — CEPHALEXIN 500 MG/1
500 CAPSULE ORAL 2 TIMES DAILY
Qty: 10 CAPSULE | Refills: 0 | Status: SHIPPED | OUTPATIENT
Start: 2019-06-25 | End: 2019-06-30

## 2019-06-25 RX ORDER — GABAPENTIN 100 MG/1
100 CAPSULE ORAL 3 TIMES DAILY
COMMUNITY

## 2019-06-25 RX ORDER — LIDOCAINE HYDROCHLORIDE 10 MG/ML
5 INJECTION, SOLUTION INFILTRATION; PERINEURAL ONCE
Status: COMPLETED | OUTPATIENT
Start: 2019-06-25 | End: 2019-06-25

## 2019-06-25 RX ORDER — LIDOCAINE HYDROCHLORIDE 10 MG/ML
5 INJECTION, SOLUTION INFILTRATION; PERINEURAL ONCE
Status: DISCONTINUED | OUTPATIENT
Start: 2019-06-25 | End: 2019-06-26 | Stop reason: HOSPADM

## 2019-06-25 RX ORDER — ACETAMINOPHEN 500 MG
1 TABLET ORAL DAILY
COMMUNITY

## 2019-06-25 RX ORDER — LORAZEPAM 0.5 MG/1
0.5 TABLET ORAL EVERY 8 HOURS PRN
COMMUNITY

## 2019-06-25 RX ORDER — CHOLECALCIFEROL (VITAMIN D3) 1250 MCG
50000 CAPSULE ORAL WEEKLY
COMMUNITY

## 2019-06-25 RX ORDER — RIVASTIGMINE TARTRATE 1.5 MG/1
1.5 CAPSULE ORAL 2 TIMES DAILY
COMMUNITY

## 2019-06-25 RX ADMIN — LIDOCAINE HYDROCHLORIDE 5 ML: 10 INJECTION, SOLUTION INFILTRATION; PERINEURAL at 19:30

## 2019-06-25 RX ADMIN — TETANUS TOXOID, REDUCED DIPHTHERIA TOXOID AND ACELLULAR PERTUSSIS VACCINE, ADSORBED 0.5 ML: 5; 2.5; 8; 8; 2.5 SUSPENSION INTRAMUSCULAR at 17:15

## 2019-06-25 ASSESSMENT — ENCOUNTER SYMPTOMS
VOMITING: 0
SHORTNESS OF BREATH: 0
COLOR CHANGE: 0
ABDOMINAL PAIN: 0
CHEST TIGHTNESS: 0
NAUSEA: 0

## 2019-06-25 ASSESSMENT — PAIN SCALES - GENERAL
PAINLEVEL_OUTOF10: 0

## 2019-06-25 NOTE — ED PROVIDER NOTES
[address]  [address] [facility] [address]  Dept: [phone]  Loc: [phone]  eMERGENCYdEPARTMENT eNCOUnter      Pt Name: Magui Mclaughlin  MRN: [id number]  [place] 9/16/1930  Date of evaluation: 6/25/2019  Provider:Veean Shafer PA-C    CHIEF COMPLAINT       Chief Complaint   Patient presents with   [facility] Fall     Per squad patient was being sent here for abnormal labs. wbc of 0.7 and hemoglobin of 7.5.  squad reports patient tripped over the foot pedal of her wheelchair and now has a laceration to left lower leg. HISTORY OF PRESENT ILLNESS  (Location/Symptom, Timing/Onset, Context/Setting, Quality, Duration,Modifying Factors, Severity.)   Magui Mclaughlin is a 80 y.o. female who presents to the emergency department by EMS following a fall. She had 2 falls at nursing home today. Patient is typically one-on-one with nursing home staff and is a fall risk. She has dementia and is at baseline mental status per nursing home staff. Patient had a fall this morning has some bruising left side of her neck and left hand. She fell this afternoon when she stood up out of her wheelchair and caught her left foot caught in the pedal.  She fell forward and hit her left shin on the wheelchair. She sustained a laceration which prompted them to call EMS. Nursing home staff states patient is currently being evaluated for leukopenia. WBC has been 0.7 6/20/2019 and 0.6 today. Hematologist has been contacted to evaluate patient. Patient was also noted to be anemic based on labs today. Hemoglobin 7.4. She is not on any anticoagulants apart from baby aspirin. Patient complaining of left leg injury following fall. Denies any other complaints this time. Denies any pain. Nursing Notes were reviewedand agreed with or any disagreements were addressed in the HPI.     REVIEW OF SYSTEMS    (2-9 systems for level 4, 10 or more for level 5) Review of Systems   Constitutional: Negative for chills and fever. HENT: Negative. Respiratory: Negative for chest tightness and shortness of breath. Cardiovascular: Negative. Gastrointestinal: Negative for abdominal pain, nausea and vomiting. Genitourinary: Negative. Musculoskeletal: Negative for arthralgias and myalgias. Skin: Positive for wound. Negative for color change, pallor and rash. Neurological: Negative for dizziness and light-headedness. Psychiatric/Behavioral: Negative for behavioral problems and confusion. Except as noted above the remainder of the review of systems was reviewed and negative. PAST MEDICAL HISTORY         Diagnosis Date    Abnormal laboratory test result     History of having positive double- stranded DNA &  recently found to be negative with a negitive ANNALEE    Anemia 2014    Follows with Dr. Caren Osorio Aortic stenosis     mod to severe AS per 10/2017 Echo    Arthritis     Cataract     Dementia     Depression     Duodenitis 2012    EGD for PUD    Episcleritis/scleritis     history of    Fracture bone        Multiple bone fractures and compression fractures    HBP (high blood pressure)     Hypothyroidism     Lupus (systemic lupus erythematosus) (HCC)     MRSA (methicillin resistant staph aureus) culture positive 06/29/2018    urine    Neuropathy     Osteoarthritis     PUD (peptic ulcer disease) 2012    gastric/prepyloric ulcers    Thyroid disease     Tremor     right hand    Venous stasis     left ankle wound; treated by Dr. Maisie Bloch           Procedure Laterality Date   Halima Pale  12/18/2017    Dr. Dirk Ang Bilateral     LEG DEBRIDEMENT Left 10/12/2015    Dr. Elan Conner     [unfilled]    ALLERGIES     Codeine; Erythromycin; Vicodin [hydrocodone-acetaminophen];  Epinephrine; no tenderness along metacarpals, no snuffbox tenderness, median/ulnar/radial nerve intact, radial pulse +2. Back: No midline tenderness throughout spine. BLE: No obvious deformity to bilateral lower extremity, no focal tenderness to palpation to bilateral lower extremities, full ROM to bilateral lower extremities. Pedal pulse +2 bilaterally   Neurological: She is alert. No cranial nerve deficit. Skin: Skin is warm. She is not diaphoretic. No erythema. Laceration to left anterior shin. Superior 7 cm with irregular skin tear to medial aspect, lateral aspect there is about a 5 cm laceration through underlying fascia with muscle exposed, remainder of laceration to shin assistant with skin tear, not grossly contaminated, positive active bleeding  Bruising noted to left lateral neck. Bruising to posterior aspect of left hand along digits 4 5, associated metacarpals. No obvious deformity   Psychiatric: She has a normal mood and affect. Her behavior is normal.   Vitals reviewed. DIAGNOSTIC RESULTS     EKG: All EKG's are interpreted by the Emergency Department Physician who either signs or Co-signs this chart in the absence of a cardiologist.    RADIOLOGY:   Non-plain film images such as CT, Ultrasound and MRI are read by the radiologist. Plain radiographic images are visualized and preliminarilyinterpreted by the emergency physician with the below findings:    Interpretation per the Radiologist below,if available at the time of this note:    [facility]   Final Result   Head CT: No acute intracranial abnormality. Cervical spine CT: No acute abnormality. CT CERVICAL SPINE WO CONTRAST   Final Result   Head CT: No acute intracranial abnormality. Cervical spine CT: No acute abnormality. XR TIBIA FIBULA LEFT (2 VIEWS)   Final Result   Left hand: Fracture of the base of the small finger proximal phalanx. Left wrist: No acute fracture or traumatic malalignment.       Left Laboratory  [address] [facility] [address]   Phone [phone]   BLOOD OCCULT STOOL DIAGNOSTIC    Narrative:     ORDER#: [id number]                          ORDERED BY: Magdiel [address]: Stool                              COLLECTED:  06/25/19 17:30  ANTIBIOTICS AT [place].:                      RECEIVED :  06/25/19 17:36  Performed at:  [facility]  [address] [facility] [address]   Phone [phone]       All other labs were within normal range or not returned as of this dictation. EMERGENCY DEPARTMENT COURSE and DIFFERENTIAL DIAGNOSIS/MDM:   Vitals:    Vitals:    06/25/19 1847 06/25/19 1916 06/25/19 1950 06/25/19 2020   BP: (!) 152/56  (!) 167/78 (!) 173/62   Pulse: 54  55 55   Resp: 11 16 11 13   Temp:       TempSrc:       SpO2: 98%  97% 100%   Weight:       Height:           MDM     Patient presents ED with HPI noted above. She is hemodynamically stable, afebrile and nontoxic-appearing. She is not hypoxic with oxygen saturation of 100% on room air. Given reported lab values at nursing home basic labs obtained as well as EKG. Please see my today's note regarding EKG interpretation. Labs showed leukopenia with a WBC of 0.8, anemia with hemoglobin of 8.2, and thrombocytopenia with platelets of 70. Patient does have neutropenia with a absolute neutrophil of 0.3. Patient has an appointment with Dr. Víctor Iyer given these abnormalities scheduled for tomorrow at 3 PM.  Son is planning on taking her to that appointment. She is afebrile and has no other complaints. Consultation made to oncology. I spoke with Dr. Kami Israel through [facility]. After discussion of labs and that she has close follow-up, I believe inpatient admission and he at this time. Patient will follow-up as outpatient with oncology for further evaluation regarding pancytopenia.   There was some dried blood noted in depends, on digital rectal exam no blood noted, stool heme negative. Patient did have evidence of hemorrhoids. No additional work-up indicated. Patient does have a skin tear as well as laceration to her left anterior shin. Laceration repaired as noted below. Unable to do any cutaneous stitches given severity of skin tear. Patient will be provided wound care specialist for follow, order done at time of discharge. Patient placed on prophylactic antibiotics. Tetanus updated. Wound dressed in the ED. Patient resides at a nursing home who can continue with wound care treatment. Patient denies any other complaints this time. Given she has dementia and had trouble fully explaining fall did obtain CT head and cervical spine with out contrast.  CT head without contrast showed no acute intracranial normality, CT cervical spine showed no acute osseous abnormality. Patient does have bruising to her left hand. X-ray showed fracture to left proximal phalanx pinky finger. Richardson tape applied. Will inform nursing home. No additional work-up indicated. No other focal tenderness noted throughout. No other complaints this time. Son updated and comfortable with plan. Patient discharged home to nursing home in stable condition. The patient tolerated their visit well. They were seen and evaluated by the attending physician, Dr. Cathy Nowak who agreed with the assessment and plan. The patient and / or the family were informed of the results of any tests, a time was given to answer questions, a plan was proposed and they agreed with plan. CONSULTS:  None    PROCEDURES:  Lac Repair  Date/Time: 6/25/2019 9:29 PM  Performed by: Carolyn Davey PA-C  Authorized by: Mei Falcon MD     Consent:     Consent obtained:  Verbal    Consent given by:  Patient    Risks discussed:  Infection, pain, poor cosmetic result and poor wound healing    Alternatives discussed:  No treatment  Anesthesia (see MAR for exact dosages):      Anesthesia method:  Local infiltration Local anesthetic:  Lidocaine 1% w/o epi  Laceration details:     Location:  Leg    Leg location:  L lower leg    Wound length (cm): skin tear with 5 cm deeper laceration through fascia at proximal end. Repair type:     Repair type: Intermediate  Pre-procedure details:     Preparation:  Patient was prepped and draped in usual sterile fashion  Exploration:     Hemostasis achieved with:  Direct pressure    Wound exploration: wound explored through full range of motion    Treatment:     Area cleansed with:  Shur-Clens    Amount of cleaning:  Extensive    Irrigation solution:  Sterile saline    Irrigation method:  Pressure wash  Skin repair:     Repair method:  Sutures    Suture size:  4-0    Wound skin closure material used: vycryl. Suture technique:  Simple interrupted    Number of sutures:  5  Approximation:     Approximation:  Close  Post-procedure details:     Dressing:  Non-adherent dressing and sterile dressing    Patient tolerance of procedure: Tolerated well, no immediate complications        FINAL IMPRESSION      1. Laceration of left lower extremity, initial encounter    2. Neutropenia, unspecified type ([address]    3. Pancytopenia ([address]    4. Fall, initial encounter          DISPOSITION/PLAN   [unfilled]    PATIENT REFERRED TO:  [facility] Emergency Department  [address] [id number]  [phone]  Go to   If symptoms worsen    [facility]  [phone]    Schedule an appointment as soon as possible for a visit in 2 days  For follow up and reevaluation. follow up with Dr. Ray Cummings to   For follow up and reevaluation as scheduled.       DISCHARGE MEDICATIONS:  New Prescriptions    CEPHALEXIN (KEFLEX) 500 MG CAPSULE    Take 1 capsule by mouth 2 times daily for 5 days       (Please note that portions of this note were completed with a voice recognition program.  Efforts were made to edit the dictations but occasionally words are mis-transcribed.)    DOLORES Hollins, [place]  06/25/19 8756

## 2019-06-25 NOTE — ED NOTES
Scant amount of dried blood noted in pt depends. RIZWAN Curiel made aware.        Letty Bowers RN  06/25/19 7495

## 2019-06-25 NOTE — ED NOTES
Report given to receiving RN Marja Angelucci, all questions answered. Pain assessment completed as documented.       Mariluz Pettit RN  06/25/19 7988

## 2019-06-25 NOTE — ED NOTES
Son called Bitna Davis) and wants a call for an update. Home phone = [phone]. RN notified.      Zia Fitzpatrick  06/25/19 1800

## 2019-06-25 NOTE — ED NOTES
Left shin laceration cleansed and covered with dry dressing. Dr. Robel Chatman at bedside to assess laceration.      Laura Krueger RN  06/25/19 1911

## 2019-06-25 NOTE — ED NOTES
Wound anesthetized per PA. Wound cleaned with antiseptic soap and saline. Pt tolerated well.      Emmanuel Aviles RN  06/25/19 1928

## 2019-06-25 NOTE — ED NOTES
Bed: B-05  Expected date: 6/25/19  Expected time: 3:35 PM  Means of arrival: [facility]  Comments:  88F tressa Ross RN  06/25/19 1378

## 2019-06-25 NOTE — ED NOTES
Fall risk screening completed. Fall risk bracelet applied to patient. Non-skid socks provided and placed on patient. The fall risk is indicated using  fall sign . Based on score, a bed alarm was indicated and applied. The call light is within the patient's reach, and instructions for use were provided. The bed is in the lowest position with wheels locked. The patient has been advised to notify staff, using the call light, if there is a need to get up or use restroom. The patient verbalized understanding of safety precautions and how to contact staff for assistance.       Stephen Chiang RN  06/25/19 5321

## 2019-06-26 NOTE — PROGRESS NOTES
Pharmacy Medication Reconciliation Note     List of medications patient is currently taking is complete. Source of information:   1. Patient's medication list from  [address]  2. EMR    Notes regarding home medications:   1. Patient received all of her morning home medication doses before presenting to the ER per nursing home.       [id number] [facility] Suzi, Pharmacy Intern  6/25/2019 8:22 PM

## 2019-07-18 ENCOUNTER — TELEPHONE (OUTPATIENT)
Dept: CARDIOLOGY CLINIC | Age: 84
End: 2019-07-18

## 2019-10-09 ENCOUNTER — CARE COORDINATION (OUTPATIENT)
Dept: CARE COORDINATION | Age: 84
End: 2019-10-09

## 2020-03-25 PROBLEM — M19.90 OSTEOARTHRITIS: Status: RESOLVED | Noted: 2020-03-25 | Resolved: 2020-03-24
